# Patient Record
Sex: FEMALE | Race: WHITE | NOT HISPANIC OR LATINO | Employment: PART TIME | URBAN - METROPOLITAN AREA
[De-identification: names, ages, dates, MRNs, and addresses within clinical notes are randomized per-mention and may not be internally consistent; named-entity substitution may affect disease eponyms.]

---

## 2017-12-14 ENCOUNTER — HOSPITAL ENCOUNTER (EMERGENCY)
Facility: HOSPITAL | Age: 47
Discharge: HOME | End: 2017-12-14
Admitting: EMERGENCY MEDICINE
Payer: COMMERCIAL

## 2017-12-14 ENCOUNTER — APPOINTMENT (EMERGENCY)
Dept: RADIOLOGY | Facility: HOSPITAL | Age: 47
End: 2017-12-14
Payer: COMMERCIAL

## 2017-12-14 VITALS
OXYGEN SATURATION: 93 % | HEART RATE: 64 BPM | TEMPERATURE: 98.5 F | DIASTOLIC BLOOD PRESSURE: 69 MMHG | WEIGHT: 200 LBS | RESPIRATION RATE: 16 BRPM | SYSTOLIC BLOOD PRESSURE: 120 MMHG

## 2017-12-14 DIAGNOSIS — S92.535A CLOSED NONDISPLACED FRACTURE OF DISTAL PHALANX OF LESSER TOE OF LEFT FOOT, INITIAL ENCOUNTER: Primary | ICD-10-CM

## 2017-12-14 PROCEDURE — 99283 EMERGENCY DEPT VISIT LOW MDM: CPT

## 2017-12-14 PROCEDURE — 73660 X-RAY EXAM OF TOE(S): CPT

## 2017-12-14 RX ORDER — TRAMADOL HYDROCHLORIDE 50 MG/1
50 TABLET ORAL EVERY 6 HOURS PRN
Qty: 10 TABLET | Refills: 0 | Status: SHIPPED | OUTPATIENT
Start: 2017-12-14 | End: 2018-08-17

## 2017-12-15 NOTE — ED NOTES
Patient transported to [facility], [address] [place] [address]  12/14/17 2022

## 2017-12-15 NOTE — ED PROVIDER NOTES
History  Chief Complaint   Patient presents with    Toe Injury     Jammed L fifth toe today  Pain, swelling, bruising to area  No medication taken PTA       47y  o female with PMH of asthma presents to the ER for left 5th toe pain since 12:30 today  Patient states she jammed her toe  She denies taking any medication for pain  She describes her pain as sharp and radiating into her foot  She states her pain is constant  She denies fever, chills, chest pain, dyspnea, N/V/D, abdominal pain, weakness or paresthesias  History provided by:  Patient   used: No        None       Past Medical History:   Diagnosis Date    Asthma     Gastritis        History reviewed  No pertinent surgical history  History reviewed  No pertinent family history  I have reviewed and agree with the history as documented  Social History   Substance Use Topics    Smoking status: Current Every Day Smoker     Packs/day: 1 00    Smokeless tobacco: Never Used    Alcohol use No        Review of Systems   Constitutional: Negative for chills and fever  Respiratory: Negative for shortness of breath  Cardiovascular: Negative for chest pain  Gastrointestinal: Negative for abdominal pain, diarrhea, nausea and vomiting  Musculoskeletal: Negative for joint swelling and neck stiffness  Skin: Negative for rash  Allergic/Immunologic: Negative for food allergies  Neurological: Negative for weakness and numbness  Physical Exam  ED Triage Vitals [12/14/17 1827]   Temperature Pulse Respirations Blood Pressure SpO2   98 5 °F (36 9 °C) 64 16 120/69 93 %      Temp Source Heart Rate Source Patient Position - Orthostatic VS BP Location FiO2 (%)   Temporal Monitor Sitting Left arm --      Pain Score       3           Orthostatic Vital Signs  Vitals:    12/14/17 1827   BP: 120/69   Pulse: 64   Patient Position - Orthostatic VS: Sitting       Physical Exam   Constitutional:  Non-toxic appearance  No distress  HENT:   Head: Normocephalic and atraumatic  Right Ear: Tympanic membrane, external ear and ear canal normal  No drainage, swelling or tenderness  No foreign bodies  Tympanic membrane is not erythematous  No hemotympanum  Left Ear: Tympanic membrane, external ear and ear canal normal  No drainage, swelling or tenderness  No foreign bodies  Tympanic membrane is not erythematous  No hemotympanum  Nose: Nose normal    Mouth/Throat: Uvula is midline, oropharynx is clear and moist and mucous membranes are normal  No uvula swelling  No posterior oropharyngeal edema, posterior oropharyngeal erythema or tonsillar abscesses  No tonsillar exudate  Neck: Normal range of motion and phonation normal  Neck supple  No tracheal deviation present  Cardiovascular: Normal rate, regular rhythm, S1 normal, S2 normal and normal heart sounds  Exam reveals no gallop and no friction rub  No murmur heard  Pulmonary/Chest: Effort normal and breath sounds normal  No respiratory distress  She has no decreased breath sounds  She has no wheezes  She has no rhonchi  She has no rales  She exhibits no tenderness  Musculoskeletal:        Left foot: There is tenderness and bony tenderness  There is normal range of motion, no swelling, normal capillary refill, no crepitus, no deformity and no laceration  Feet:    Neurological: She is alert  GCS eye subscore is 4  GCS verbal subscore is 5  GCS motor subscore is 6  Skin: Skin is warm and dry  No rash noted  Psychiatric: She has a normal mood and affect  Nursing note and vitals reviewed        ED Medications  Medications - No data to display    Diagnostic Studies  Results Reviewed     None                 XR toe fifth min 2 views LEFT   ED Interpretation by Janett Adair PA-C (12/14 2031)   Fracture of base of distal phalanx                 Procedures  Orthopedic Injury  Date/Time: 12/14/2017 8:38 PM  Performed by: Lisandra Alexander by: Twan Mora   Consent: Verbal consent obtained  Consent given by: patient  Patient understanding: patient states understanding of the procedure being performed  Imaging studies: imaging studies available  Patient identity confirmed: arm band  Injury location: toe  Location details: left fifth toe  Injury type: fracture  Fracture type: distal phalanx  Pre-procedure neurovascular assessment: neurovascularly intact  Pre-procedure distal perfusion: normal  Pre-procedure neurological function: normal  Pre-procedure range of motion: normal    Anesthesia:  Local anesthesia used: no  Manipulation performed: no  Immobilization: other (comment) (ortho shoe)  Post-procedure neurovascular assessment: post-procedure neurovascularly intact  Post-procedure distal perfusion: normal  Post-procedure neurological function: normal  Post-procedure range of motion: normal  Patient tolerance: Patient tolerated the procedure well with no immediate complications             Phone Contacts  ED Phone Contact    ED Course  ED Course                                MDM  Number of Diagnoses or Management Options  Closed nondisplaced fracture of distal phalanx of lesser toe of left foot, initial encounter: new and requires workup  Diagnosis management comments: DDX consists of but not limited to: fracture, contusion, dislocation, sprain    Will xray the toe to r/o fracture  At discharge, I instructed the patient to:  -follow up with pcp  -take Tylenol or Motrin for mild pain  -take Tramadol as prescribed for moderate pain  -follow up with the recommended Podiatrist  -rest, ice and elevate the foot  -wear the ortho shoe  -return to the ER if symptoms worsened or new symptoms arose  Patient agreed to this plan and was stable at time of discharge         Amount and/or Complexity of Data Reviewed  Tests in the radiology section of CPT®: ordered and reviewed  Independent visualization of images, tracings, or specimens: yes    Patient Progress  Patient progress: stable    CritCare Time    Disposition  Final diagnoses:   Closed nondisplaced fracture of distal phalanx of lesser toe of left foot, initial encounter     Time reflects when diagnosis was documented in both MDM as applicable and the Disposition within this note     Time User Action Codes Description Comment    12/14/2017  8:32 PM Namita BLAIR Add [[id number] [id number]] Closed nondisplaced fracture of distal phalanx of lesser toe of left foot, initial encounter       ED Disposition     ED Disposition Condition Comment    Discharge  [facility] discharge to home/self care  Condition at discharge: Stable        Follow-up Information     Follow up With Specialties Details Why [address]  Schedule an appointment as soon as possible for a visit in 1 day  [address]      Raisa Chan DPM Podiatry Schedule an appointment as soon as possible for a visit in 1 day  Anastasia Sandoval [address] [place] [address]          Discharge Medication List as of 12/14/2017  8:43 PM      START taking these medications    Details   traMADol (ULTRAM) 50 mg tablet Take 1 tablet by mouth every 6 (six) hours as needed for moderate pain, Starting Thu 12/14/2017, Print           No discharge procedures on file      ED Provider  Electronically Signed by           Stephen Amaya PA-C  12/14/17 1007

## 2017-12-15 NOTE — DISCHARGE INSTRUCTIONS
Toe Fracture   WHAT YOU NEED TO KNOW:   A toe fracture is a break in 1 or more of the bones in your toe  It is most commonly caused by a direct blow to the toe  The bones in your toe may just be broken, or they may be out of place or   DISCHARGE INSTRUCTIONS:   Return to the emergency department if:   · Blood soaks through your bandage  · You have severe pain in your toe  · Your toe is cold or numb  Contact your healthcare provider if:   · You have a fever  · Your pain does not go away, even after treatment  · Your toe continues to hurt even after it has healed  · You have questions or concerns about your condition or care  Medicines: You may need any of the following:  · NSAIDs , such as ibuprofen, help decrease swelling, pain, and fever  This medicine is available with or without a doctor's order  NSAIDs can cause stomach bleeding or kidney problems in certain people  If you take blood thinner medicine, always ask your healthcare provider if NSAIDs are safe for you  Always read the medicine label and follow directions  · Prescription pain medicine  may be given  Ask your healthcare provider how to take this medicine safely  Some prescription pain medicines contain acetaminophen  Do not take other medicines that contain acetaminophen without talking to your healthcare provider  Too much acetaminophen may cause liver damage  Prescription pain medicine may cause constipation  Ask your healthcare provider how to prevent or treat constipation  · Antibiotics  treat a bacterial infection  You may need antibiotics if you have an open wound  · Take your medicine as directed  Contact your healthcare provider if you think your medicine is not helping or if you have side effects  Tell him of her if you are allergic to any medicine  Keep a list of the medicines, vitamins, and herbs you take  Include the amounts, and when and why you take them   Bring the list or the pill bottles to follow-up visits  Carry your medicine list with you in case of an emergency  Self-care:   · Use abel tape, an elastic bandage, or a splint as directed  These help keep your toe in its correct position as it heals  Abel tape is when your fractured toe and the toe next to it are taped together  · Use support devices  including a cane, crutches, walking boot, or hard soled shoe as directed  These help protect your broken toe and limit movement so it can heal     · Rest  your toe so that it can heal  Return to normal activities as directed  · Apply ice  on your toe for 15 to 20 minutes every hour or as directed  Use an ice pack, or put crushed ice in a plastic bag  Cover it with a towel  Ice helps prevent tissue damage and decreases swelling and pain  · Elevate  your toe above the level of your heart as often as you can  This will help decrease swelling and pain  Prop your toe on pillows or blankets to keep it elevated comfortably  Follow up with your healthcare provider as directed: You may need to see a podiatrist in 1 to 2 weeks  Write down your questions so you remember to ask them during your visits  © 2017 [facility] Information is for End User's use only and may not be sold, redistributed or otherwise used for commercial purposes  All illustrations and images included in CareNotes® are the copyrighted property of [employer]  or Ricky Soria  The above information is an  only  It is not intended as medical advice for individual conditions or treatments  Talk to your doctor, nurse or pharmacist before following any medical regimen to see if it is safe and effective for you  DISCHARGE INSTRUCTIONS:    FOLLOW UP WITH YOUR PRIMARY CARE PROVIDER OR THE [address] [place] [address]  MAKE AN APPOINTMENT TO BE SEEN  TAKE TYLENOL OR MOTRIN FOR PAIN  TAKE TRAMADOL AS PRESCRIBED FOR MODERATE PAIN   IF RASH, SHORTNESS OF BREATH OR TROUBLE SWALLOWING, STOP TAKING THE MEDICATION AND BE SEEN  NO DRINKING, DRIVING OR OPERATING HEAVY MACHINERY WHILE TAKING THIS MEDICATION  FOLLOW UP WITH THE RECOMMENDED PODIATRY SPECIALIST  MAKE AN APPOINTMENT TO BE SEEN  REST, ICE AND ELEVATE THE AREA  WEAR THE ORTHOPEDIC SHOE  IF SYMPTOMS WORSEN OR NEW SYMPTOMS ARISE, RETURN TO THE ER TO BE SEEN

## 2018-03-26 ENCOUNTER — OFFICE VISIT (OUTPATIENT)
Dept: URGENT CARE | Facility: MEDICAL CENTER | Age: 48
End: 2018-03-26
Payer: COMMERCIAL

## 2018-03-26 VITALS
HEIGHT: 65 IN | DIASTOLIC BLOOD PRESSURE: 80 MMHG | OXYGEN SATURATION: 97 % | SYSTOLIC BLOOD PRESSURE: 120 MMHG | BODY MASS INDEX: 38.45 KG/M2 | TEMPERATURE: 97.6 F | HEART RATE: 69 BPM | RESPIRATION RATE: 18 BRPM | WEIGHT: 230.8 LBS

## 2018-03-26 DIAGNOSIS — K29.50 CHRONIC GASTRITIS WITHOUT BLEEDING, UNSPECIFIED GASTRITIS TYPE: Primary | ICD-10-CM

## 2018-03-26 PROCEDURE — 99213 OFFICE O/P EST LOW 20 MIN: CPT | Performed by: FAMILY MEDICINE

## 2018-03-26 RX ORDER — OMEPRAZOLE 10 MG/1
10 CAPSULE, DELAYED RELEASE ORAL
COMMUNITY
End: 2018-03-26 | Stop reason: DRUGHIGH

## 2018-03-26 RX ORDER — OMEPRAZOLE 40 MG/1
40 CAPSULE, DELAYED RELEASE ORAL DAILY
Qty: 30 CAPSULE | Refills: 0 | Status: SHIPPED | OUTPATIENT
Start: 2018-03-26 | End: 2018-04-13 | Stop reason: SDUPTHER

## 2018-03-26 RX ORDER — ALBUTEROL SULFATE 90 UG/1
1 AEROSOL, METERED RESPIRATORY (INHALATION) EVERY 6 HOURS
COMMUNITY
End: 2018-04-13 | Stop reason: SDUPTHER

## 2018-03-27 NOTE — PROGRESS NOTES
[facility]        NAME: Allen Pulido is a 52 y o  female  : 1970    MRN: [id number]  DATE: 2018  TIME: 10:29 PM    Assessment and Plan   Chronic gastritis without bleeding, unspecified gastritis type [K29 50]  1  Chronic gastritis without bleeding, unspecified gastritis type  omeprazole (PriLOSEC) 40 MG capsule         Patient Instructions       Follow up with PCP in 3-5 days  Proceed to  ER if symptoms worsen  Chief Complaint     Chief Complaint   Patient presents with    Abdominal Pain     for several days  History of Present Illness       71-year-old female here today with 4 day history of abdominal pain  Abdominal pain is predominant midepigastric and travels to the size  It is at times accompanied by nausea but no vomiting  She has a known history of gastritis  However, 4 days ago she ran out of her prescription strength omeprazole 40 mg  She has been taking over-the-counter omeprazole 2 capsules daily with no significant improvement  Denies any fever chills  Abdominal Pain   Associated symptoms include nausea  Review of Systems   Review of Systems   Constitutional: Negative  Gastrointestinal: Positive for abdominal pain and nausea           Current Medications       Current Outpatient Prescriptions:     albuterol (PROVENTIL HFA,VENTOLIN HFA) 90 mcg/act inhaler, Inhale 1 puff every 6 (six) hours, Disp: , Rfl:     omeprazole (PriLOSEC) 40 MG capsule, Take 1 capsule (40 mg total) by mouth daily, Disp: 30 capsule, Rfl: 0    traMADol (ULTRAM) 50 mg tablet, Take 1 tablet by mouth every 6 (six) hours as needed for moderate pain, Disp: 10 tablet, Rfl: 0    Current Allergies     Allergies as of 2018 - Reviewed 2018   Allergen Reaction Noted    Other  2017    Penicillins  2017            The following portions of the patient's history were reviewed and updated as appropriate: allergies, current medications, past family history, past medical history, past social history, past surgical history and problem list      Past Medical History:   Diagnosis Date    Asthma     Gastritis        No past surgical history on file  No family history on file  Medications have been verified  Objective   /80   Pulse 69   Temp 97 6 °F (36 4 °C) (Tympanic)   Resp 18   Ht 5' 5" (1 651 m)   Wt 105 kg (230 lb 12 8 oz)   SpO2 97%   BMI 38 41 kg/m²        Physical Exam     Physical Exam   Abdominal: Bowel sounds are normal  There is tenderness  There is no rebound  Nursing note and vitals reviewed

## 2018-03-27 NOTE — PATIENT INSTRUCTIONS
I prescribed omeprazole 40 mg capsule once daily  Advised patient to follow dietary changes which she is aware of  Strongly advised her to schedule appointment with her primary care provider  For gas, I recommended over-the-counter simethicone she can use as needed  Duodenitis   WHAT YOU NEED TO KNOW:   Duodenitis is inflammation or irritation of the duodenum  The duodenum is the first part of the small intestine, just below your stomach  DISCHARGE INSTRUCTIONS:   Return to the emergency department if:   · You have severe abdominal pain  · You have bloody or black, tarry bowel movements or vomit  Contact your healthcare provider if:   · You have new or worsening symptoms, even after treatment  · You have questions or concerns about your condition or care  Medicines:   · Medicines  may be given to treat an infection or to control stomach acid  · Take your medicine as directed  Contact your healthcare provider if you think your medicine is not helping or if you have side effects  Tell him of her if you are allergic to any medicine  Keep a list of the medicines, vitamins, and herbs you take  Include the amounts, and when and why you take them  Bring the list or the pill bottles to follow-up visits  Carry your medicine list with you in case of an emergency  Follow up with your healthcare provider as directed: You may need ongoing tests or treatment  Write down your questions so you remember to ask them during your visits  Do not smoke:  Nicotine and other chemicals in cigarettes and cigars can cause blood vessel and lung damage  Ask your healthcare provider for information if you currently smoke and need help to quit  E-cigarettes or smokeless tobacco still contain nicotine  Talk to your healthcare provider before you use these products  Limit or do not drink alcohol:  Alcohol can make your duodenitis worse  Talk to your healthcare provider if you need help to stop drinking    Keep batteries and similar objects out of the reach of children:  Babies often put items in their mouths to explore them  Button batteries are easy to swallow and can cause serious damage  Keep the battery covers of electronic devices such as remote controls taped closed  Store all batteries and toxic materials where children cannot get to them  Use childproof locks to keep children away from dangerous materials  Manage or prevent duodenitis:   · Do not take NSAIDs or aspirin unless directed  These and similar medicines can cause irritation  It may help to take NSAIDs with food, but you may not be able to take them at all  · Do not eat foods that cause irritation  Foods such as oranges and salsa can cause burning or pain  Eat a variety of healthy foods  Examples include fruits (not citrus), vegetables, low-fat dairy products, beans, whole-grain breads, and lean meats and fish  Try to eat small meals, and drink water with your meals  Do not eat for at least 3 hours before you go to bed  © 2017 [employer]  Information is for End User's use only and may not be sold, redistributed or otherwise used for commercial purposes  All illustrations and images included in CareNotes® are the copyrighted property of [employer]  or Ricky Soria  The above information is an  only  It is not intended as medical advice for individual conditions or treatments  Talk to your doctor, nurse or pharmacist before following any medical regimen to see if it is safe and effective for you

## 2018-04-13 ENCOUNTER — OFFICE VISIT (OUTPATIENT)
Dept: FAMILY MEDICINE CLINIC | Facility: CLINIC | Age: 48
End: 2018-04-13
Payer: COMMERCIAL

## 2018-04-13 VITALS
HEIGHT: 65 IN | DIASTOLIC BLOOD PRESSURE: 74 MMHG | WEIGHT: 224 LBS | SYSTOLIC BLOOD PRESSURE: 122 MMHG | TEMPERATURE: 98.1 F | OXYGEN SATURATION: 97 % | RESPIRATION RATE: 18 BRPM | BODY MASS INDEX: 37.32 KG/M2 | HEART RATE: 81 BPM

## 2018-04-13 DIAGNOSIS — Z13.1 SCREENING FOR DIABETES MELLITUS: ICD-10-CM

## 2018-04-13 DIAGNOSIS — Z13.220 SCREENING CHOLESTEROL LEVEL: ICD-10-CM

## 2018-04-13 DIAGNOSIS — K29.50 CHRONIC GASTRITIS WITHOUT BLEEDING, UNSPECIFIED GASTRITIS TYPE: Primary | ICD-10-CM

## 2018-04-13 DIAGNOSIS — Z12.31 ENCOUNTER FOR SCREENING MAMMOGRAM FOR BREAST CANCER: ICD-10-CM

## 2018-04-13 DIAGNOSIS — Z12.4 SCREENING FOR CERVICAL CANCER: ICD-10-CM

## 2018-04-13 DIAGNOSIS — M77.12 LEFT LATERAL EPICONDYLITIS: ICD-10-CM

## 2018-04-13 DIAGNOSIS — E55.9 VITAMIN D DEFICIENCY: ICD-10-CM

## 2018-04-13 DIAGNOSIS — J45.20 MILD INTERMITTENT ASTHMA WITHOUT COMPLICATION: ICD-10-CM

## 2018-04-13 PROBLEM — J30.9 ALLERGIC RHINITIS: Status: ACTIVE | Noted: 2018-04-13

## 2018-04-13 PROCEDURE — 99204 OFFICE O/P NEW MOD 45 MIN: CPT | Performed by: PHYSICIAN ASSISTANT

## 2018-04-13 RX ORDER — OMEPRAZOLE 40 MG/1
40 CAPSULE, DELAYED RELEASE ORAL DAILY
Qty: 90 CAPSULE | Refills: 1 | Status: SHIPPED | OUTPATIENT
Start: 2018-04-13 | End: 2018-10-18 | Stop reason: SDUPTHER

## 2018-04-13 RX ORDER — ALBUTEROL SULFATE 90 UG/1
1 AEROSOL, METERED RESPIRATORY (INHALATION) EVERY 6 HOURS
Qty: 1 EACH | Refills: 2 | Status: SHIPPED | OUTPATIENT
Start: 2018-04-13 | End: 2018-07-23 | Stop reason: SDUPTHER

## 2018-04-13 NOTE — ASSESSMENT & PLAN NOTE
Stable  Continue using rescue inhaler as needed  If using inhaler more than twice a week would recommend making a follow-up appointment to see if maintenance medication needs to be used to control symptoms

## 2018-04-13 NOTE — PROGRESS NOTES
Assessment/Plan:    Chronic gastritis without bleeding  Stable  Continue with omeprazole 40 mg daily  Mild intermittent asthma without complication  Stable  Continue using rescue inhaler as needed  If using inhaler more than twice a week would recommend making a follow-up appointment to see if maintenance medication needs to be used to control symptoms  Allergic rhinitis  Stable  Continue to use Claritin as needed  For lateral epicondylitis, would recommend using NSAIDs to decrease the swelling, and also recommend RICE  If there is no improvement in symptoms make follow-up appointment we can refer to physical therapy for further evaluation and treatment  Diagnoses and all orders for this visit:    Chronic gastritis without bleeding, unspecified gastritis type  -     omeprazole (PriLOSEC) 40 MG capsule; Take 1 capsule (40 mg total) by mouth daily    Mild intermittent asthma without complication  -     albuterol (PROVENTIL HFA,VENTOLIN HFA) 90 mcg/act inhaler; Inhale 1 puff every 6 (six) hours    Left lateral epicondylitis    Screening cholesterol level  -     Lipid panel; Future    Screening for diabetes mellitus  -     CBC and differential; Future  -     Comprehensive metabolic panel; Future  -     Urinalysis with reflex to microscopic; Future    Encounter for screening mammogram for breast cancer  -     Mammo screening bilateral w cad; Future    Vitamin D deficiency  -     Vitamin D 25 hydroxy; Future    Screening for cervical cancer  -     Ambulatory referral to Obstetrics / Gynecology; Future          Subjective:      Patient ID: Bobby Burns is a 52 y o  female  72-year-old female presenting to establish care  Patient has recently moved here from [place]  Patient states that she has been diagnosed with herniated disc in her cervical and lumbar spine  Currently has no concerns or pain in these areas  Has been diagnosed with chronic gastritis    Was placed on omeprazole 2 years ago, and states she has not had any problems since  At that time she also had a colonoscopy and EGD completed  Denies any concerns at this time  Has been diagnosed with vitamin-D deficiency in the past and was on vitamin D supplements  Currently has allergies, and uses Claritin as needed  Has also been diagnosed with asthma and uses a rescue inhaler on an as-needed basis  Patient does have concerns of left elbow pain and swelling  States that the pain has been going on for about 2 weeks  Describes it as a sharp pain on the lateral aspect of the elbow  Has also noticed swelling in this area  Pain is typically worse in the morning after waking up, and also when excessively using her left elbow or trying to grab something with her left hand  Has not been taking anything over-the-counter for this  Denies any injury to the left elbow  Patient has never had a mammogram completed  Currently does not have an OBGYN in this area  The following portions of the patient's history were reviewed and updated as appropriate:   She  has a past medical history of Asthma and Gastritis  She   Patient Active Problem List    Diagnosis Date Noted    Chronic gastritis without bleeding 04/13/2018    Mild intermittent asthma without complication 32/57/7329    Allergic rhinitis 04/13/2018     She  has no past surgical history on file  Her family history includes Cancer in her mother; No Known Problems in her father  She  reports that she has been smoking  She has been smoking about 1 00 pack per day  She has never used smokeless tobacco  She reports that she does not drink alcohol or use drugs    Current Outpatient Prescriptions   Medication Sig Dispense Refill    albuterol (PROVENTIL HFA,VENTOLIN HFA) 90 mcg/act inhaler Inhale 1 puff every 6 (six) hours 1 each 2    omeprazole (PriLOSEC) 40 MG capsule Take 1 capsule (40 mg total) by mouth daily 90 capsule 1    traMADol (ULTRAM) 50 mg tablet Take 1 tablet by mouth every 6 (six) hours as needed for moderate pain 10 tablet 0     No current facility-administered medications for this visit  She is allergic to other and penicillins       Review of Systems   Constitutional: Negative for chills, fatigue and fever  HENT: Negative for congestion, ear pain, hearing loss, rhinorrhea and sore throat  Eyes: Negative for pain and visual disturbance  Respiratory: Negative for cough, shortness of breath and wheezing  Cardiovascular: Negative for chest pain, palpitations and leg swelling  Gastrointestinal: Negative for abdominal pain, blood in stool, constipation, diarrhea, nausea and vomiting  Endocrine: Negative for cold intolerance, heat intolerance and polyuria  Genitourinary: Negative for difficulty urinating, dysuria, frequency, hematuria and urgency  Musculoskeletal: Negative for arthralgias, joint swelling and myalgias  Skin: Negative for rash and wound  Neurological: Negative for dizziness, syncope, weakness, numbness and headaches  Psychiatric/Behavioral: Negative for dysphoric mood and sleep disturbance  The patient is not nervous/anxious  Objective:      /74 (BP Location: Right arm, Patient Position: Sitting, Cuff Size: Large)   Pulse 81   Temp 98 1 °F (36 7 °C) (Tympanic)   Resp 18   Ht 5' 5" (1 651 m)   Wt 102 kg (224 lb)   SpO2 97%   BMI 37 28 kg/m²          Physical Exam   Constitutional: She is oriented to person, place, and time  She appears well-developed and well-nourished  HENT:   Right Ear: Hearing, tympanic membrane, external ear and ear canal normal    Left Ear: Hearing, tympanic membrane, external ear and ear canal normal    Nose: Nose normal    Mouth/Throat: Oropharynx is clear and moist and mucous membranes are normal  No oropharyngeal exudate  Eyes: Conjunctivae, EOM and lids are normal  Pupils are equal, round, and reactive to light  Neck: Normal range of motion  Neck supple     Cardiovascular: Normal rate, regular rhythm, normal heart sounds and normal pulses  Exam reveals no gallop and no friction rub  No murmur heard  Pulmonary/Chest: Effort normal and breath sounds normal  No respiratory distress  She has no wheezes  She has no rales  Abdominal: Soft  Normal appearance and bowel sounds are normal  She exhibits no distension  There is no tenderness  There is no rebound and no guarding  Musculoskeletal: Normal range of motion  She exhibits no edema  Left forearm: She exhibits tenderness and swelling  She exhibits no edema, no deformity and no laceration  Lymphadenopathy:     She has no cervical adenopathy  Neurological: She is alert and oriented to person, place, and time  She has normal reflexes  No cranial nerve deficit  Skin: Skin is warm and dry  No rash noted  Psychiatric: She has a normal mood and affect   Her behavior is normal

## 2018-06-10 ENCOUNTER — OFFICE VISIT (OUTPATIENT)
Dept: URGENT CARE | Facility: MEDICAL CENTER | Age: 48
End: 2018-06-10
Payer: COMMERCIAL

## 2018-06-10 VITALS
TEMPERATURE: 96.8 F | HEART RATE: 72 BPM | OXYGEN SATURATION: 100 % | RESPIRATION RATE: 20 BRPM | SYSTOLIC BLOOD PRESSURE: 132 MMHG | DIASTOLIC BLOOD PRESSURE: 74 MMHG

## 2018-06-10 DIAGNOSIS — R31.9 URINARY TRACT INFECTION WITH HEMATURIA, SITE UNSPECIFIED: Primary | ICD-10-CM

## 2018-06-10 DIAGNOSIS — R30.0 DYSURIA: ICD-10-CM

## 2018-06-10 DIAGNOSIS — N39.0 URINARY TRACT INFECTION WITH HEMATURIA, SITE UNSPECIFIED: Primary | ICD-10-CM

## 2018-06-10 LAB
SL AMB  POCT GLUCOSE, UA: NEGATIVE
SL AMB LEUKOCYTE ESTERASE,UA: ABNORMAL
SL AMB POCT BILIRUBIN,UA: NEGATIVE
SL AMB POCT BLOOD,UA: ABNORMAL
SL AMB POCT CLARITY,UA: ABNORMAL
SL AMB POCT COLOR,UA: YELLOW
SL AMB POCT KETONES,UA: NEGATIVE
SL AMB POCT NITRITE,UA: POSITIVE
SL AMB POCT PH,UA: 6
SL AMB POCT SPECIFIC GRAVITY,UA: 1.02
SL AMB POCT URINE PROTEIN: ABNORMAL
SL AMB POCT UROBILINOGEN: NEGATIVE

## 2018-06-10 PROCEDURE — 87186 SC STD MICRODIL/AGAR DIL: CPT | Performed by: FAMILY MEDICINE

## 2018-06-10 PROCEDURE — 99213 OFFICE O/P EST LOW 20 MIN: CPT | Performed by: FAMILY MEDICINE

## 2018-06-10 PROCEDURE — 87086 URINE CULTURE/COLONY COUNT: CPT | Performed by: FAMILY MEDICINE

## 2018-06-10 PROCEDURE — 87077 CULTURE AEROBIC IDENTIFY: CPT | Performed by: FAMILY MEDICINE

## 2018-06-10 PROCEDURE — 81002 URINALYSIS NONAUTO W/O SCOPE: CPT | Performed by: FAMILY MEDICINE

## 2018-06-10 RX ORDER — CIPROFLOXACIN 500 MG/1
500 TABLET, FILM COATED ORAL EVERY 12 HOURS SCHEDULED
Qty: 10 TABLET | Refills: 0 | Status: SHIPPED | COMMUNITY
Start: 2018-06-10 | End: 2018-06-15

## 2018-06-10 NOTE — PROGRESS NOTES
[facility]        NAME: Mandy Wu is a 52 y o  female  : 1970    MRN: [id number]  DATE: Shirley 10, 2018  TIME: 4:40 PM    Assessment and Plan   Urinary tract infection with hematuria, site unspecified [N39 0, R31 9]  1  Urinary tract infection with hematuria, site unspecified  ciprofloxacin (CIPRO) 500 mg tablet   2  Dysuria  POCT urine dip    Urine culture         Patient Instructions       Follow up with PCP in 3-5 days  Proceed to  ER if symptoms worsen  Chief Complaint     Chief Complaint   Patient presents with    Possible UTI     dysuria and frequency for 1 day  Right low back pain for 1 week   Denies fever or chills  History of Present Illness       Patient complaining of right lower back pain for the past week  Seems to be dull in nature but has increased in intensity  She has also developed some frequency and urgency in the last 2 days  Also in last 2 days she has developed some dysuria  Denies any fever or chills  Taking no medication for current symptoms  Review of Systems   Review of Systems   Constitutional: Negative  Genitourinary: Positive for dysuria, flank pain, frequency and urgency           Current Medications       Current Outpatient Prescriptions:     albuterol (PROVENTIL HFA,VENTOLIN HFA) 90 mcg/act inhaler, Inhale 1 puff every 6 (six) hours, Disp: 1 each, Rfl: 2    omeprazole (PriLOSEC) 40 MG capsule, Take 1 capsule (40 mg total) by mouth daily, Disp: 90 capsule, Rfl: 1    traMADol (ULTRAM) 50 mg tablet, Take 1 tablet by mouth every 6 (six) hours as needed for moderate pain, Disp: 10 tablet, Rfl: 0    ciprofloxacin (CIPRO) 500 mg tablet, Take 1 tablet (500 mg total) by mouth every 12 (twelve) hours for 5 days, Disp: 10 tablet, Rfl: 0    Current Allergies     Allergies as of 06/10/2018 - Reviewed 06/10/2018   Allergen Reaction Noted    Other  2017    Penicillins  2017            The following portions of the patient's history were reviewed and updated as appropriate: allergies, current medications, past family history, past medical history, past social history, past surgical history and problem list      Past Medical History:   Diagnosis Date    Asthma     Gastritis        No past surgical history on file  Family History   Problem Relation Age of Onset    Cancer Mother     No Known Problems Father          Medications have been verified  Objective   /74 (BP Location: Left arm, Patient Position: Sitting, Cuff Size: Standard)   Pulse 72   Temp (!) 96 8 °F (36 °C) (Tympanic)   Resp 20   SpO2 100%        Physical Exam     Physical Exam   Abdominal: Soft  There is tenderness  Tenderness over suprapubic area  No rebound or guarding appreciated  No CVA tenderness elicited  Nursing note and vitals reviewed

## 2018-06-10 NOTE — PATIENT INSTRUCTIONS
Urine dipstick reveals moderate leukocytes, nitrite positive and blood  Patient started empirically on Cipro 500 mg twice a day for 5 days  Encourage patient increase fluid intake  Urinary Tract Infection in Women   WHAT YOU NEED TO KNOW:   A urinary tract infection (UTI) is caused by bacteria that get inside your urinary tract  Most bacteria that enter your urinary tract come out when you urinate  If the bacteria stay in your urinary tract, you may get an infection  Your urinary tract includes your kidneys, ureters, bladder, and urethra  Urine is made in your kidneys, and it flows from the ureters to the bladder  Urine leaves the bladder through the urethra  A UTI is more common in your lower urinary tract, which includes your bladder and urethra  DISCHARGE INSTRUCTIONS:   Return to the emergency department if:   · You are urinating very little or not at all  · You have a high fever with shaking chills  · You have side or back pain that gets worse  Contact your healthcare provider if:   · You have a fever  · You do not feel better after 2 days of taking antibiotics  · You are vomiting  · You have questions or concerns about your condition or care  Medicines:   · Antibiotics  help fight a bacterial infection  · Medicines  may be given to decrease pain and burning when you urinate  They will also help decrease the feeling that you need to urinate often  These medicines will make your urine orange or red  · Take your medicine as directed  Contact your healthcare provider if you think your medicine is not helping or if you have side effects  Tell him or her if you are allergic to any medicine  Keep a list of the medicines, vitamins, and herbs you take  Include the amounts, and when and why you take them  Bring the list or the pill bottles to follow-up visits  Carry your medicine list with you in case of an emergency    Follow up with your healthcare provider as directed:  Write down your questions so you remember to ask them during your visits  Prevent another UTI:   · Empty your bladder often  Urinate and empty your bladder as soon as you feel the need  Do not hold your urine for long periods of time  · Wipe from front to back after you urinate or have a bowel movement  This will help prevent germs from getting into your urinary tract through your urethra  · Drink liquids as directed  Ask how much liquid to drink each day and which liquids are best for you  You may need to drink more liquids than usual to help flush out the bacteria  Do not drink alcohol, caffeine, or citrus juices  These can irritate your bladder and increase your symptoms  Your healthcare provider may recommend cranberry juice to help prevent a UTI  · Urinate after you have sex  This can help flush out bacteria passed during sex  · Do not douche or use feminine deodorants  These can change the chemical balance in your vagina  · Change sanitary pads or tampons often  This will help prevent germs from getting into your urinary tract  · Do pelvic muscle exercises often  Pelvic muscle exercises may help you start and stop urinating  Strong pelvic muscles may help you empty your bladder easier  Squeeze these muscles tightly for 5 seconds like you are trying to hold back urine  Then relax for 5 seconds  Gradually work up to squeezing for 10 seconds  Do 3 sets of 15 repetitions a day, or as directed  © 2017 [employer] Information is for End User's use only and may not be sold, redistributed or otherwise used for commercial purposes  All illustrations and images included in CareNotes® are the copyrighted property of [employer]  or [employer]  The above information is an  only  It is not intended as medical advice for individual conditions or treatments   Talk to your doctor, nurse or pharmacist before following any medical regimen to see if it is safe and effective for you

## 2018-06-12 LAB — BACTERIA UR CULT: ABNORMAL

## 2018-07-23 DIAGNOSIS — J45.20 MILD INTERMITTENT ASTHMA WITHOUT COMPLICATION: ICD-10-CM

## 2018-08-17 ENCOUNTER — OFFICE VISIT (OUTPATIENT)
Dept: URGENT CARE | Facility: MEDICAL CENTER | Age: 48
End: 2018-08-17
Payer: COMMERCIAL

## 2018-08-17 VITALS
SYSTOLIC BLOOD PRESSURE: 112 MMHG | HEART RATE: 76 BPM | DIASTOLIC BLOOD PRESSURE: 80 MMHG | RESPIRATION RATE: 18 BRPM | TEMPERATURE: 98.8 F | WEIGHT: 219 LBS | HEIGHT: 65 IN | BODY MASS INDEX: 36.49 KG/M2 | OXYGEN SATURATION: 95 %

## 2018-08-17 DIAGNOSIS — J02.9 SORE THROAT: Primary | ICD-10-CM

## 2018-08-17 LAB — S PYO AG THROAT QL: NEGATIVE

## 2018-08-17 PROCEDURE — 99213 OFFICE O/P EST LOW 20 MIN: CPT | Performed by: PHYSICIAN ASSISTANT

## 2018-08-17 PROCEDURE — 87070 CULTURE OTHR SPECIMN AEROBIC: CPT | Performed by: PHYSICIAN ASSISTANT

## 2018-08-17 PROCEDURE — 87147 CULTURE TYPE IMMUNOLOGIC: CPT | Performed by: PHYSICIAN ASSISTANT

## 2018-08-18 NOTE — PATIENT INSTRUCTIONS
Rapid strep was negative will order culture patient may call in 2 days for results  Use ibuprofen for pain or fever  Pharyngitis   WHAT YOU NEED TO KNOW:   Pharyngitis, or sore throat, is inflammation of the tissues and structures in your pharynx (throat)  Pharyngitis is most often caused by bacteria  It may also be caused by a cold or flu virus  Other causes include smoking, allergies, or acid reflux  DISCHARGE INSTRUCTIONS:   Call 911 for any of the following:   · You have trouble breathing or swallowing because your throat is swollen or sore  Return to the emergency department if:   · You are drooling because it hurts too much to swallow  · Your fever is higher than 102? F (39?C) or lasts longer than 3 days  · You are confused  · You taste blood in your throat  Contact your healthcare provider if:   · Your throat pain gets worse  · You have a painful lump in your throat that does not go away after 5 days  · Your symptoms do not improve after 5 days  · You have questions or concerns about your condition or care  Medicines:  Viral pharyngitis will go away on its own without treatment  Your sore throat should start to feel better in 3 to 5 days for both viral and bacterial infections  You may need any of the following:  · Antibiotics  treat a bacterial infection  · NSAIDs , such as ibuprofen, help decrease swelling, pain, and fever  NSAIDs can cause stomach bleeding or kidney problems in certain people  If you take blood thinner medicine, always ask your healthcare provider if NSAIDs are safe for you  Always read the medicine label and follow directions  · Acetaminophen  decreases pain and fever  It is available without a doctor's order  Ask how much to take and how often to take it  Follow directions  Acetaminophen can cause liver damage if not taken correctly  · Take your medicine as directed    Contact your healthcare provider if you think your medicine is not helping or if you have side effects  Tell him or her if you are allergic to any medicine  Keep a list of the medicines, vitamins, and herbs you take  Include the amounts, and when and why you take them  Bring the list or the pill bottles to follow-up visits  Carry your medicine list with you in case of an emergency  Manage your symptoms:   · Gargle salt water  Mix ¼ teaspoon salt in an 8 ounce glass of warm water and gargle  This may help decrease swelling in your throat  · Drink liquids as directed  You may need to drink more liquids than usual  Liquids may help soothe your throat and prevent dehydration  Ask how much liquid to drink each day and which liquids are best for you  · Use a cool-steam humidifier  to help moisten the air in your room and calm your cough  · Soothe your throat  with cough drops, ice, soft foods, or popsicles  Prevent the spread of pharyngitis:  Cover your mouth and nose when you cough or sneeze  Do not share food or drinks  Wash your hands often  Use soap and water  If soap and water are unavailable, use an alcohol based hand   Follow up with your healthcare provider as directed:  Write down your questions so you remember to ask them during your visits  © 2017 [employer] Information is for End User's use only and may not be sold, redistributed or otherwise used for commercial purposes  All illustrations and images included in CareNotes® are the copyrighted property of [employer]  or [employer]  The above information is an  only  It is not intended as medical advice for individual conditions or treatments  Talk to your doctor, nurse or pharmacist before following any medical regimen to see if it is safe and effective for you

## 2018-08-18 NOTE — PROGRESS NOTES
[facility]        NAME: Giancarlo Agarwal is a 52 y o  female  : 1970    MRN: [id number]  DATE: 2018  TIME: 8:37 PM    Assessment and Plan   Sore throat [J02 9]  1  Sore throat  POCT rapid strepA         Patient Instructions     Follow up with PCP in 3-5 days  Proceed to  ER if symptoms worsen  Chief Complaint     Chief Complaint   Patient presents with    Sore Throat     Symptoms x 3 days   Nasal Congestion         History of Present Illness       80-year-old female presents for complaints of sore throat and congestion  Patient states she has a history of allergies and believes her congestion and postnasal drip is from her allergies  She developed a sore throat 3 days ago and is complaining of pain with swallowing  She is also complaining of pus in her throat, body aches and pains as well  She is not taking any over-the-counter medication for relief  Review of Systems   Review of Systems   Constitutional: Negative  HENT: Positive for congestion and sore throat  Negative for ear discharge, ear pain, facial swelling, hearing loss, postnasal drip, rhinorrhea, sinus pain, sinus pressure, sneezing and trouble swallowing  Eyes: Negative  Respiratory: Negative  Gastrointestinal: Negative  Skin: Negative            Current Medications       Current Outpatient Prescriptions:     omeprazole (PriLOSEC) 40 MG capsule, Take 1 capsule (40 mg total) by mouth daily, Disp: 90 capsule, Rfl: 1    VENTOLIN  (90 Base) MCG/ACT inhaler, INHALE 1 PUFF BY MOUTH EVERY 6 HOURS, Disp: 18 g, Rfl: 2    Current Allergies     Allergies as of 2018 - Reviewed 06/10/2018   Allergen Reaction Noted    Other  2017    Penicillin g  2017    Penicillins  2017            The following portions of the patient's history were reviewed and updated as appropriate: allergies, current medications, past family history, past medical history, past social history, past surgical history and problem list     Objective   /80   Pulse 76   Temp 98 8 °F (37 1 °C)   Resp 18   Ht 5' 5" (1 651 m)   Wt 99 3 kg (219 lb)   SpO2 95%   BMI 36 44 kg/m²        Physical Exam     Physical Exam   Constitutional: Vital signs are normal  She appears well-developed and well-nourished  No distress  HENT:   Head: Normocephalic and atraumatic  Right Ear: Hearing, tympanic membrane, external ear and ear canal normal    Left Ear: Hearing, tympanic membrane, external ear and ear canal normal    Nose: Nose normal  No mucosal edema or rhinorrhea  Right sinus exhibits no maxillary sinus tenderness and no frontal sinus tenderness  Left sinus exhibits no maxillary sinus tenderness and no frontal sinus tenderness  Mouth/Throat: Uvula is midline and mucous membranes are normal  Oropharyngeal exudate and posterior oropharyngeal erythema present  No posterior oropharyngeal edema or tonsillar abscesses  Eyes: Conjunctivae and lids are normal    Cardiovascular: Normal rate, regular rhythm and normal heart sounds  No murmur heard  Pulmonary/Chest: Effort normal and breath sounds normal  No respiratory distress  She has no decreased breath sounds  She has no wheezes  She has no rhonchi  She has no rales  Lymphadenopathy:        Head (right side): No submandibular and no tonsillar adenopathy present  Head (left side): No submandibular and no tonsillar adenopathy present  Skin: No rash noted  Nursing note and vitals reviewed

## 2018-08-20 ENCOUNTER — OFFICE VISIT (OUTPATIENT)
Dept: FAMILY MEDICINE CLINIC | Facility: CLINIC | Age: 48
End: 2018-08-20
Payer: COMMERCIAL

## 2018-08-20 VITALS
HEIGHT: 65 IN | DIASTOLIC BLOOD PRESSURE: 70 MMHG | WEIGHT: 218 LBS | SYSTOLIC BLOOD PRESSURE: 124 MMHG | TEMPERATURE: 97 F | OXYGEN SATURATION: 96 % | RESPIRATION RATE: 18 BRPM | HEART RATE: 93 BPM | BODY MASS INDEX: 36.32 KG/M2

## 2018-08-20 DIAGNOSIS — J45.20 MILD INTERMITTENT ASTHMA WITHOUT COMPLICATION: ICD-10-CM

## 2018-08-20 DIAGNOSIS — J30.1 ALLERGIC RHINITIS DUE TO POLLEN, UNSPECIFIED SEASONALITY: Primary | ICD-10-CM

## 2018-08-20 LAB — BACTERIA THROAT CULT: ABNORMAL

## 2018-08-20 PROCEDURE — 99214 OFFICE O/P EST MOD 30 MIN: CPT | Performed by: PHYSICIAN ASSISTANT

## 2018-08-20 RX ORDER — LORATADINE 10 MG/1
10 TABLET ORAL DAILY
Qty: 90 TABLET | Refills: 0 | Status: SHIPPED | OUTPATIENT
Start: 2018-08-20 | End: 2018-08-21 | Stop reason: SDUPTHER

## 2018-08-20 RX ORDER — MONTELUKAST SODIUM 10 MG/1
10 TABLET ORAL
Qty: 90 TABLET | Refills: 0 | Status: SHIPPED | OUTPATIENT
Start: 2018-08-20 | End: 2018-09-20 | Stop reason: SDUPTHER

## 2018-08-20 NOTE — PATIENT INSTRUCTIONS
Start loratadine 10 mg daily  Start singular 10 mg at bedtime  Offered a nasal steroid spray but patient states that she has side effects with nose bleeds and prefers to hold on medication  Advised that it may take at least 4 weeks for the medications to take effect  Follow up with your primary care provider if there is no improvement in 4 weeks or any symptoms increase

## 2018-08-20 NOTE — PROGRESS NOTES
Assessment/Plan:    Urgent care note from August 17th has been reviewed    Patient Instructions   Start loratadine 10 mg daily  Start singular 10 mg at bedtime  Offered a nasal steroid spray but patient states that she has side effects with nose bleeds and prefers to hold on medication  Advised that it may take at least 4 weeks for the medications to take effect  Follow up with your primary care provider if there is no improvement in 4 weeks or any symptoms increase  M*Modal software was used to dictate this note  It may contain errors with dictating incorrect words/spelling  Please contact provider directly for any questions  Diagnoses and all orders for this visit:    Allergic rhinitis due to pollen, unspecified seasonality  -     loratadine (CLARITIN) 10 mg tablet; Take 1 tablet (10 mg total) by mouth daily  -     montelukast (SINGULAIR) 10 mg tablet; Take 1 tablet (10 mg total) by mouth daily at bedtime    Mild intermittent asthma without complication          Subjective:      Patient ID: Jerry Rapp is a 52 y o  female  Patient presents today for an acute visit for evaluation of postnasal drip, congestion  She was seen in the urgent care center 3 days ago and had a throat culture which came back negative  She has also been having some throat discomfort  Ear pain  Denies any fevers or chills  She relates her symptoms to allergies but is concerned about taking allergy medication because of drowsy side effects  She recently moved here from [place] and has found that her allergy symptoms have been more significant  She has been noticing the symptoms for approximately 1 week  She has been utilizing Robitussin over-the-counter as needed  She infrequently utilizes her rescue inhaler  Sore Throat    Associated symptoms include congestion, coughing and ear pain         The following portions of the patient's history were reviewed and updated as appropriate:   She  has a past medical history of Asthma and Gastritis  She   Patient Active Problem List    Diagnosis Date Noted    Chronic gastritis without bleeding 04/13/2018    Mild intermittent asthma without complication 05/87/8730    Allergic rhinitis 04/13/2018     She  has no past surgical history on file  Her family history includes Cancer in her mother; No Known Problems in her father  She  reports that she has been smoking  She has been smoking about 1 00 pack per day  She has never used smokeless tobacco  She reports that she does not drink alcohol or use drugs  Current Outpatient Prescriptions   Medication Sig Dispense Refill    loratadine (CLARITIN) 10 mg tablet Take 1 tablet (10 mg total) by mouth daily 90 tablet 0    montelukast (SINGULAIR) 10 mg tablet Take 1 tablet (10 mg total) by mouth daily at bedtime 90 tablet 0    omeprazole (PriLOSEC) 40 MG capsule Take 1 capsule (40 mg total) by mouth daily 90 capsule 1    VENTOLIN  (90 Base) MCG/ACT inhaler INHALE 1 PUFF BY MOUTH EVERY 6 HOURS 18 g 2     No current facility-administered medications for this visit  Current Outpatient Prescriptions on File Prior to Visit   Medication Sig    omeprazole (PriLOSEC) 40 MG capsule Take 1 capsule (40 mg total) by mouth daily    VENTOLIN  (90 Base) MCG/ACT inhaler INHALE 1 PUFF BY MOUTH EVERY 6 HOURS     No current facility-administered medications on file prior to visit  She is allergic to other; penicillin g; and penicillins       Review of Systems   Constitutional: Negative for chills and fever  HENT: Positive for congestion, ear pain and sore throat  Respiratory: Positive for cough  Objective:      /70 (BP Location: Right arm, Patient Position: Sitting, Cuff Size: Large)   Pulse 93   Temp (!) 97 °F (36 1 °C) (Tympanic)   Resp 18   Ht 5' 5" (1 651 m)   Wt 98 9 kg (218 lb)   SpO2 96%   Breastfeeding?  No   BMI 36 28 kg/m²          Physical Exam   Constitutional: She appears well-developed and well-nourished  No distress  HENT:   Head: Normocephalic and atraumatic  Right Ear: External ear normal    Left Ear: External ear normal    Mouth/Throat: Oropharynx is clear and moist    Neck: Neck supple  Cardiovascular: Normal rate, regular rhythm and normal heart sounds  No murmur heard  Pulmonary/Chest: Effort normal and breath sounds normal  No respiratory distress  She has no wheezes  She has no rales  Lymphadenopathy:     She has no cervical adenopathy

## 2018-08-21 DIAGNOSIS — J30.1 ALLERGIC RHINITIS DUE TO POLLEN, UNSPECIFIED SEASONALITY: ICD-10-CM

## 2018-08-21 RX ORDER — LORATADINE 10 MG/1
TABLET ORAL
Qty: 90 TABLET | Refills: 0 | Status: SHIPPED | OUTPATIENT
Start: 2018-08-21 | End: 2019-03-22

## 2018-08-22 ENCOUNTER — TELEPHONE (OUTPATIENT)
Dept: URGENT CARE | Facility: MEDICAL CENTER | Age: 48
End: 2018-08-22

## 2018-08-22 DIAGNOSIS — J02.0 STREP PHARYNGITIS: Primary | ICD-10-CM

## 2018-08-22 RX ORDER — AZITHROMYCIN 250 MG/1
TABLET, FILM COATED ORAL
Qty: 6 TABLET | Refills: 0 | Status: SHIPPED | OUTPATIENT
Start: 2018-08-22 | End: 2018-08-26

## 2018-09-20 ENCOUNTER — OFFICE VISIT (OUTPATIENT)
Dept: FAMILY MEDICINE CLINIC | Facility: CLINIC | Age: 48
End: 2018-09-20
Payer: COMMERCIAL

## 2018-09-20 VITALS
SYSTOLIC BLOOD PRESSURE: 122 MMHG | RESPIRATION RATE: 18 BRPM | BODY MASS INDEX: 36.15 KG/M2 | TEMPERATURE: 97.5 F | OXYGEN SATURATION: 98 % | DIASTOLIC BLOOD PRESSURE: 72 MMHG | HEIGHT: 65 IN | HEART RATE: 78 BPM | WEIGHT: 217 LBS

## 2018-09-20 DIAGNOSIS — J30.1 ALLERGIC RHINITIS DUE TO POLLEN, UNSPECIFIED SEASONALITY: ICD-10-CM

## 2018-09-20 DIAGNOSIS — J02.0 STREP THROAT: ICD-10-CM

## 2018-09-20 DIAGNOSIS — J02.9 SORE THROAT: Primary | ICD-10-CM

## 2018-09-20 LAB — S PYO AG THROAT QL: NEGATIVE

## 2018-09-20 PROCEDURE — 3008F BODY MASS INDEX DOCD: CPT | Performed by: PHYSICIAN ASSISTANT

## 2018-09-20 PROCEDURE — 87147 CULTURE TYPE IMMUNOLOGIC: CPT | Performed by: PHYSICIAN ASSISTANT

## 2018-09-20 PROCEDURE — 87070 CULTURE OTHR SPECIMN AEROBIC: CPT | Performed by: PHYSICIAN ASSISTANT

## 2018-09-20 PROCEDURE — 99213 OFFICE O/P EST LOW 20 MIN: CPT | Performed by: PHYSICIAN ASSISTANT

## 2018-09-20 PROCEDURE — 87880 STREP A ASSAY W/OPTIC: CPT | Performed by: PHYSICIAN ASSISTANT

## 2018-09-20 RX ORDER — AZITHROMYCIN 250 MG/1
TABLET, FILM COATED ORAL
Qty: 6 TABLET | Refills: 0 | Status: SHIPPED | OUTPATIENT
Start: 2018-09-20 | End: 2018-09-24

## 2018-09-20 RX ORDER — MONTELUKAST SODIUM 10 MG/1
10 TABLET ORAL
Qty: 90 TABLET | Refills: 0 | Status: SHIPPED | OUTPATIENT
Start: 2018-09-20 | End: 2019-03-22

## 2018-09-20 NOTE — ASSESSMENT & PLAN NOTE
Discussed with patient that there is an increase in ragweed right now, so it is recommended to be taking her allergy medication  This being be 1 of the causes of her sore throat at this time

## 2018-09-20 NOTE — PROGRESS NOTES
Assessment/Plan:    Allergic rhinitis  Discussed with patient that there is an increase in ragweed right now, so it is recommended to be taking her allergy medication  This being be 1 of the causes of her sore throat at this time  Rapid strep came back negative, white spots are noted on her throat  Possible exudates, but can't rule out tonsil stones  Will send over azithromycin for possible infection  Did instruct patient to try and use a toothbrush to see if white spots will be removed  If there is no improvement symptoms, would recommend following up with ENT  Diagnoses and all orders for this visit:    Sore throat  -     POCT rapid strepA  -     Throat culture  -     Ambulatory Referral to Otolaryngology; Future    Allergic rhinitis due to pollen, unspecified seasonality  -     montelukast (SINGULAIR) 10 mg tablet; Take 1 tablet (10 mg total) by mouth daily at bedtime    Strep throat  -     azithromycin (ZITHROMAX) 250 mg tablet; Take 2 tablets today then 1 tablet daily x 4 days          Subjective:      Patient ID: Ole Cabrera is a 52 y o  female  70-year-old female presenting with concerns of sore throat  Patient has been previously diagnosed with allergic rhinitis  States that she has been having increasing sore throat for the last 5 days  Over the last 2 days she has noticed increasing white spots her tonsils  States that she has nasal congestion, rhinorrhea  Denies any fever, chills, chest pain, shortness of breath  Has been taking Tylenol over-the-counter with some relief  Recently has not been taking her allergy medication  The following portions of the patient's history were reviewed and updated as appropriate:   She  has a past medical history of Asthma and Gastritis    She   Patient Active Problem List    Diagnosis Date Noted    Chronic gastritis without bleeding 04/13/2018    Mild intermittent asthma without complication 56/83/6416    Allergic rhinitis 04/13/2018 She  has no past surgical history on file  Her family history includes Cancer in her mother; No Known Problems in her father  She  reports that she has been smoking  She has been smoking about 1 00 pack per day  She has never used smokeless tobacco  She reports that she does not drink alcohol or use drugs  Current Outpatient Prescriptions   Medication Sig Dispense Refill    azithromycin (ZITHROMAX) 250 mg tablet Take 2 tablets today then 1 tablet daily x 4 days 6 tablet 0    loratadine (CLARITIN) 10 mg tablet TAKE 1 TABLET(10 MG) BY MOUTH DAILY 90 tablet 0    montelukast (SINGULAIR) 10 mg tablet Take 1 tablet (10 mg total) by mouth daily at bedtime 90 tablet 0    omeprazole (PriLOSEC) 40 MG capsule Take 1 capsule (40 mg total) by mouth daily 90 capsule 1    VENTOLIN  (90 Base) MCG/ACT inhaler INHALE 1 PUFF BY MOUTH EVERY 6 HOURS 18 g 2     No current facility-administered medications for this visit  She is allergic to other; penicillin g; and penicillins       Review of Systems   Constitutional: Negative for chills, fatigue and fever  HENT: Positive for congestion, postnasal drip, rhinorrhea and sore throat  Negative for ear discharge, ear pain and trouble swallowing  Eyes: Negative for itching  Respiratory: Negative for cough, chest tightness, shortness of breath and wheezing  Cardiovascular: Negative for chest pain  Objective:      /72 (BP Location: Right arm, Patient Position: Sitting, Cuff Size: Large)   Pulse 78   Temp 97 5 °F (36 4 °C) (Tympanic)   Resp 18   Ht 5' 5" (1 651 m)   Wt 98 4 kg (217 lb)   SpO2 98%   BMI 36 11 kg/m²          Physical Exam   Constitutional: She appears well-developed and well-nourished  No distress  HENT:   Right Ear: Hearing, tympanic membrane, external ear and ear canal normal    Left Ear: Hearing, tympanic membrane, external ear and ear canal normal    Nose: Mucosal edema and rhinorrhea present     Mouth/Throat: Mucous membranes are normal  Oropharyngeal exudate and posterior oropharyngeal edema present  Neck: Normal range of motion  Neck supple  Cardiovascular: Normal rate, regular rhythm and normal heart sounds  Exam reveals no gallop and no friction rub  No murmur heard  Pulmonary/Chest: Effort normal and breath sounds normal  No respiratory distress  She has no wheezes  She has no rales  Lymphadenopathy:     She has cervical adenopathy  Skin: She is not diaphoretic

## 2018-09-23 LAB — BACTERIA THROAT CULT: ABNORMAL

## 2018-10-12 DIAGNOSIS — J45.20 MILD INTERMITTENT ASTHMA WITHOUT COMPLICATION: ICD-10-CM

## 2018-10-18 DIAGNOSIS — K29.50 CHRONIC GASTRITIS WITHOUT BLEEDING, UNSPECIFIED GASTRITIS TYPE: ICD-10-CM

## 2018-10-18 RX ORDER — OMEPRAZOLE 40 MG/1
CAPSULE, DELAYED RELEASE ORAL
Qty: 90 CAPSULE | Refills: 0 | Status: SHIPPED | OUTPATIENT
Start: 2018-10-18 | End: 2019-01-17 | Stop reason: SDUPTHER

## 2018-11-05 ENCOUNTER — OFFICE VISIT (OUTPATIENT)
Dept: URGENT CARE | Facility: MEDICAL CENTER | Age: 48
End: 2018-11-05
Payer: COMMERCIAL

## 2018-11-05 VITALS
BODY MASS INDEX: 35.82 KG/M2 | OXYGEN SATURATION: 97 % | WEIGHT: 215 LBS | HEIGHT: 65 IN | SYSTOLIC BLOOD PRESSURE: 139 MMHG | TEMPERATURE: 98.2 F | RESPIRATION RATE: 18 BRPM | DIASTOLIC BLOOD PRESSURE: 66 MMHG | HEART RATE: 82 BPM

## 2018-11-05 DIAGNOSIS — J30.9 ALLERGIC RHINITIS, UNSPECIFIED SEASONALITY, UNSPECIFIED TRIGGER: ICD-10-CM

## 2018-11-05 DIAGNOSIS — J02.9 ACUTE PHARYNGITIS, UNSPECIFIED ETIOLOGY: Primary | ICD-10-CM

## 2018-11-05 PROCEDURE — 99213 OFFICE O/P EST LOW 20 MIN: CPT | Performed by: FAMILY MEDICINE

## 2018-11-05 RX ORDER — TRIAMCINOLONE ACETONIDE 55 UG/1
2 SPRAY, METERED NASAL DAILY
Qty: 60 ACT | Refills: 0 | Status: SHIPPED | OUTPATIENT
Start: 2018-11-05 | End: 2019-03-22

## 2018-11-06 NOTE — PATIENT INSTRUCTIONS
I prescribed Nasacort nasal spray-2 sprays in each nostril once daily, xylocaine viscous for sore throat  Advised patient to gargle with salt water and neck some hydrogen peroxide to cleanse tonsil stones left tonsil area as needed  Use rescue inhaler as needed  Follow up with primary care provider symptoms worsen  Pharyngitis   WHAT YOU NEED TO KNOW:   Pharyngitis, or sore throat, is inflammation of the tissues and structures in your pharynx (throat)  Pharyngitis is most often caused by bacteria  It may also be caused by a cold or flu virus  Other causes include smoking, allergies, or acid reflux  DISCHARGE INSTRUCTIONS:   Call 911 for any of the following:   · You have trouble breathing or swallowing because your throat is swollen or sore  Return to the emergency department if:   · You are drooling because it hurts too much to swallow  · Your fever is higher than 102? F (39?C) or lasts longer than 3 days  · You are confused  · You taste blood in your throat  Contact your healthcare provider if:   · Your throat pain gets worse  · You have a painful lump in your throat that does not go away after 5 days  · Your symptoms do not improve after 5 days  · You have questions or concerns about your condition or care  Medicines:  Viral pharyngitis will go away on its own without treatment  Your sore throat should start to feel better in 3 to 5 days for both viral and bacterial infections  You may need any of the following:  · Antibiotics  treat a bacterial infection  · NSAIDs , such as ibuprofen, help decrease swelling, pain, and fever  NSAIDs can cause stomach bleeding or kidney problems in certain people  If you take blood thinner medicine, always ask your healthcare provider if NSAIDs are safe for you  Always read the medicine label and follow directions  · Acetaminophen  decreases pain and fever  It is available without a doctor's order  Ask how much to take and how often to take it  Follow directions  Acetaminophen can cause liver damage if not taken correctly  · Take your medicine as directed  Contact your healthcare provider if you think your medicine is not helping or if you have side effects  Tell him or her if you are allergic to any medicine  Keep a list of the medicines, vitamins, and herbs you take  Include the amounts, and when and why you take them  Bring the list or the pill bottles to follow-up visits  Carry your medicine list with you in case of an emergency  Manage your symptoms:   · Gargle salt water  Mix ¼ teaspoon salt in an 8 ounce glass of warm water and gargle  This may help decrease swelling in your throat  · Drink liquids as directed  You may need to drink more liquids than usual  Liquids may help soothe your throat and prevent dehydration  Ask how much liquid to drink each day and which liquids are best for you  · Use a cool-steam humidifier  to help moisten the air in your room and calm your cough  · Soothe your throat  with cough drops, ice, soft foods, or popsicles  Prevent the spread of pharyngitis:  Cover your mouth and nose when you cough or sneeze  Do not share food or drinks  Wash your hands often  Use soap and water  If soap and water are unavailable, use an alcohol based hand   Follow up with your healthcare provider as directed:  Write down your questions so you remember to ask them during your visits  © 2017 [employer] Information is for End User's use only and may not be sold, redistributed or otherwise used for commercial purposes  All illustrations and images included in CareNotes® are the copyrighted property of [employer]  or [employer]  The above information is an  only  It is not intended as medical advice for individual conditions or treatments  Talk to your doctor, nurse or pharmacist before following any medical regimen to see if it is safe and effective for you    Allergic Rhinitis   WHAT YOU NEED TO KNOW:   Allergic rhinitis, or hay fever, is swelling of the inside of your nose  The swelling is a reaction to allergens in the air  An allergen can be anything that causes an allergic reaction  Allergies to weeds, grass, trees, or mold often cause seasonal allergic rhinitis  Indoor dust mites, cockroaches, pet dander, or mold can also cause allergic rhinitis  DISCHARGE INSTRUCTIONS:   Call 911 for the following:   · You have chest pain or shortness of breath  Return to the emergency department if:   · You have severe pain  · You cough up blood  Contact your healthcare provider if:   · You have a fever  · You have ear or sinus pain, or a headache  · Your symptoms get worse, even after treatment  · You have yellow, green, brown, or bloody mucus coming from your nose  · Your nose is bleeding or you have pain inside your nose  · You have trouble sleeping because of your symptoms  · You have questions or concerns about your condition or care  Medicines:   · Medicines  help decrease your symptoms and clear your stuffy nose  · Take your medicine as directed  Contact your healthcare provider if you think your medicine is not helping or if you have side effects  Tell him of her if you are allergic to any medicine  Keep a list of the medicines, vitamins, and herbs you take  Include the amounts, and when and why you take them  Bring the list or the pill bottles to follow-up visits  Carry your medicine list with you in case of an emergency  How to manage allergic rhinitis:  The best way to manage allergic rhinitis is to avoid allergens that can trigger your symptoms  Any of the following may help decrease your symptoms:  · Rinse your nose and sinuses  with a salt water solution or use a salt water nasal spray  This will help thin the mucus in your nose and rinse away pollen and dirt  It will also help reduce swelling so you can breathe normally   Ask your healthcare provider how often to rinse your nose  · Reduce exposure to dust mites  Wash sheets and towels in hot water every week  Cover your pillows and mattresses with allergen-free covers  Limit the number of stuffed animals and soft toys your child has  Wash your child's toys in hot water regularly  Vacuum weekly and use a vacuum  with an air filter  If possible, get rid of carpets and curtains  These collect dust and dust mites  · Reduce exposure to pollen  Keep windows and doors closed in your house and car  Stay inside when air pollution or the pollen count is high  Run your air conditioner on recycle, and change air filters often  Shower and wash your hair before bed every night to rinse away pollen  · Reduce exposure to pet dander  If possible, do not keep cats, dogs, birds, or other pets  If you do keep pets in your home, keep them out of bedrooms and carpeted rooms  Bathe them often  · Reduce exposure to mold  Do not spend time in basements  Choose artificial plants instead of live plants  Keep your home's humidity at less than 45%  Do not have ponds or standing water in your home or yard  · Do not smoke  Avoid others who smoke  Ask your healthcare provider for information if you currently smoke and need help to quit  Follow up with your healthcare provider as directed: You may need to see an allergist often to control your symptoms  Write down your questions so you remember to ask them during your visits  © 2017 [employer] Information is for End User's use only and may not be sold, redistributed or otherwise used for commercial purposes  All illustrations and images included in CareNotes® are the copyrighted property of [employer]  or [employer] Soria  The above information is an  only  It is not intended as medical advice for individual conditions or treatments   Talk to your doctor, nurse or pharmacist before following any medical regimen to see if it is safe and effective for you

## 2018-11-16 DIAGNOSIS — J45.20 MILD INTERMITTENT ASTHMA WITHOUT COMPLICATION: ICD-10-CM

## 2018-12-17 DIAGNOSIS — J45.20 MILD INTERMITTENT ASTHMA WITHOUT COMPLICATION: ICD-10-CM

## 2019-01-17 DIAGNOSIS — K29.50 CHRONIC GASTRITIS WITHOUT BLEEDING, UNSPECIFIED GASTRITIS TYPE: ICD-10-CM

## 2019-01-18 RX ORDER — OMEPRAZOLE 40 MG/1
CAPSULE, DELAYED RELEASE ORAL
Qty: 90 CAPSULE | Refills: 1 | Status: SHIPPED | OUTPATIENT
Start: 2019-01-18 | End: 2019-07-11 | Stop reason: SDUPTHER

## 2019-01-26 ENCOUNTER — APPOINTMENT (EMERGENCY)
Dept: RADIOLOGY | Facility: HOSPITAL | Age: 49
End: 2019-01-26

## 2019-01-26 ENCOUNTER — APPOINTMENT (EMERGENCY)
Dept: NON INVASIVE DIAGNOSTICS | Facility: HOSPITAL | Age: 49
End: 2019-01-26

## 2019-01-26 ENCOUNTER — HOSPITAL ENCOUNTER (EMERGENCY)
Facility: HOSPITAL | Age: 49
Discharge: HOME | End: 2019-01-26
Attending: EMERGENCY MEDICINE | Admitting: EMERGENCY MEDICINE

## 2019-01-26 VITALS
TEMPERATURE: 97.4 F | DIASTOLIC BLOOD PRESSURE: 70 MMHG | RESPIRATION RATE: 18 BRPM | WEIGHT: 198 LBS | SYSTOLIC BLOOD PRESSURE: 124 MMHG | BODY MASS INDEX: 32.95 KG/M2 | OXYGEN SATURATION: 96 % | HEART RATE: 68 BPM

## 2019-01-26 DIAGNOSIS — M79.671 RIGHT FOOT PAIN: Primary | ICD-10-CM

## 2019-01-26 PROCEDURE — 73630 X-RAY EXAM OF FOOT: CPT

## 2019-01-26 PROCEDURE — 93971 EXTREMITY STUDY: CPT

## 2019-01-26 PROCEDURE — 99284 EMERGENCY DEPT VISIT MOD MDM: CPT

## 2019-01-26 PROCEDURE — 93971 EXTREMITY STUDY: CPT | Performed by: SURGERY

## 2019-01-26 PROCEDURE — 96372 THER/PROPH/DIAG INJ SC/IM: CPT

## 2019-01-26 RX ORDER — KETOROLAC TROMETHAMINE 30 MG/ML
15 INJECTION, SOLUTION INTRAMUSCULAR; INTRAVENOUS ONCE
Status: COMPLETED | OUTPATIENT
Start: 2019-01-26 | End: 2019-01-26

## 2019-01-26 RX ORDER — NAPROXEN 500 MG/1
500 TABLET ORAL 2 TIMES DAILY WITH MEALS
Qty: 12 TABLET | Refills: 0 | Status: SHIPPED | OUTPATIENT
Start: 2019-01-26 | End: 2019-03-22

## 2019-01-26 RX ADMIN — KETOROLAC TROMETHAMINE 15 MG: 30 INJECTION, SOLUTION INTRAMUSCULAR at 09:47

## 2019-01-26 NOTE — DISCHARGE INSTRUCTIONS
Metatarsalgia   AMBULATORY CARE:   Metatarsalgia  is pain in the ball of your foot, near your second, third, and fourth toes  Common signs and symptoms of metatarsalgia:  Symptoms usually develop over time, but you may have sudden pain from an injury  You may have any of the following:  · Pain at the ball of your foot or near your toes that gets worse when you walk or stand, especially on hard surfaces    · Pain during exercises such as running    · Sharp or shooting pain in your toes that may get worse when you flex your toes    · Tingling or numbness in your toes    · Feeling like you are walking over rocks, or that you have a bruise    · A change in the way you walk because you try to avoid putting pressure on the ball of your foot  Contact your healthcare provider if:   · You develop knee, back, or hip pain  · You have more pain or redness in the foot  · You have questions or concerns about your condition or care  Treatment:  The cause of your metatarsalgia will be treated, if possible  You may also need any of the following:  · NSAIDs , such as ibuprofen, help decrease swelling, pain, and fever  This medicine is available with or without a doctor's order  NSAIDs can cause stomach bleeding or kidney problems in certain people  If you take blood thinner medicine, always ask if NSAIDs are safe for you  Always read the medicine label and follow directions  Do not give these medicines to children under 10months of age without direction from your child's healthcare provider  · Ultrasound  may be used to relieve your pain  Sound waves from the ultrasound can help send heat deeper into your tissues  · A steroid injection  may help decrease inflammation  · Surgery  may be needed if other treatments do not work  Surgery is used to align the bones near your toes  You may also need surgery to fix a problem such as hammertoe  Manage or prevent metatarsalgia:   · Rest your foot    If you play sports, you may not be able to do weight-bearing exercises  Examples include swimming and bike riding  Ask your healthcare provider which exercises are safe for you  · Apply ice as directed  Ice helps reduce pain and swelling  Use an ice pack, or put crushed ice in a plastic bag  Cover the pack or bag with a towel before you apply it to your foot  Apply ice for 15 to 20 minutes every hour, or as directed  · Use a cane or crutch if directed  These devices may help take pressure off your foot while it heals  · Wear proper shoes  Do not wear shoes that are narrow or tight  You may need to wear shoes that are wider than you usually wear  Choose shoes that do not have a raised heel  Shock-absorbing shoes can help prevent injury  These shoes will have extra support under your feet and toes  You can also add shoe cushions inside your shoes or to the bottoms of your feet, near your toes  The cushions may provide more support and make walking or standing more comfortable  Arch supports may help take pressure off your toes  · Reach or maintain a healthy weight  Extra weight can put pressure on your feet  Talk to your healthcare provider about a healthy weight for you  Your provider can help you create a safe weight loss plan if you are overweight  · Go to physical therapy if directed  A physical therapist can help improve your strength and range of motion  The therapist can also help you improve the way you walk to prevent metatarsalgia from happening again  Your therapist can also teach you exercises to help relieve your pain  Follow up with your healthcare provider as directed:  Write down your questions so you remember to ask them during your visits  © 2017 [facility] Information is for End User's use only and may not be sold, redistributed or otherwise used for commercial purposes   All illustrations and images included in CareNotes® are the copyrighted property of [employer]  or Ricky Soria  The above information is an  only  It is not intended as medical advice for individual conditions or treatments  Talk to your doctor, nurse or pharmacist before following any medical regimen to see if it is safe and effective for you  Plantar Fasciitis   WHAT YOU NEED TO KNOW:   Rest, ice, and foot supports can help your plantar fascia heal  You may need medicines to decrease swelling and pain  A physical therapist can teach you exercises to help improve movement and strength, and to decrease pain  DISCHARGE INSTRUCTIONS:   Contact your healthcare provider or podiatrist if:   · Your pain and swelling increase  · You develop knee, hip, or back pain  · You have questions or concerns about your condition or care  Medicines: You may  need any of the following:  · Acetaminophen  decreases pain and fever  It is available without a doctor's order  Ask how much to give your child and how often to give it  Follow directions  Read the labels of all other medicines your child uses to see if they also contain acetaminophen, or ask your child's doctor or pharmacist  Acetaminophen can cause liver damage if not taken correctly  · NSAIDs , such as ibuprofen, help decrease swelling, pain, and fever  NSAIDs can cause stomach bleeding or kidney problems in certain people  If you take blood thinner medicine, always ask your healthcare provider if NSAIDs are safe for you  Always read the medicine label and follow directions  · Take your medicine as directed  Contact your healthcare provider if you think your medicine is not helping or if you have side effects  Tell him of her if you are allergic to any medicine  Keep a list of the medicines, vitamins, and herbs you take  Include the amounts, and when and why you take them  Bring the list or the pill bottles to follow-up visits  Carry your medicine list with you in case of an emergency    Self-care:   · Wear your splint or shoe inserts as directed  You may need to wear a splint at night to keep your foot stretched while you sleep  This will help prevent sharp pain first thing in the morning  Shoe inserts will help decrease stress on your plantar fascia when you walk or exercise  · Rest as directed  Rest as much as possible to decrease swelling and prevent more damage  Ask your healthcare provider when you can return to your normal activities  · Apply ice on your plantar fascia  Ice helps prevent tissue damage and decreases swelling and pain  Fill a water bottle with water and freeze it  Wrap a towel around the bottle or cover it with a pillow case  Roll the water bottle under your foot for 10 minutes in the morning and after work  · Massage your plantar fascia as directed  This may help decrease swelling and pain  Roll a golf ball under your foot for 10 minutes  Repeat 3 times each day  · Go to physical therapy as directed  A physical therapist teaches you exercises to help improve movement and strength, and to decrease pain  Prevent plantar fasciitis:   · Maintain a healthy weight  This will help decrease stress on your feet  Ask your healthcare provider how much you should weigh  Ask him to help you create a weight loss plan if you are overweight  · Do low-impact exercises  Low-impact exercises decrease stress on your plantar fascia  Examples include swimming or bicycling  · Start new activities slowly  Increase the intensity and time gradually  · Wear shoes that fit well and support your arch  Replace your shoes before the padding or shock absorption wears out  Do not walk or  bare feet or sandals for long periods of time  · Stretch before you exercise  Ask your healthcare provider how to stretch your plantar fascia and calf muscles  Follow up with your healthcare provider or podiatrist as directed:  Write down your questions so you remember to ask them during your visits     © 2017 [facility] Information is for End User's use only and may not be sold, redistributed or otherwise used for commercial purposes  All illustrations and images included in CareNotes® are the copyrighted property of A [employer] Inc  or Ricky Soria  The above information is an  only  It is not intended as medical advice for individual conditions or treatments  Talk to your doctor, nurse or pharmacist before following any medical regimen to see if it is safe and effective for you

## 2019-01-26 NOTE — ED PROVIDER NOTES
History  Chief Complaint   Patient presents with    Foot Pain     Reports to have plantar R foot pain with radiation of pain to lateral side of foot and calf area  Pain for five days  Reports mass on lateral aspect of foot  The patient is a 51-year-old female with a past medical history of asthma, seasonal allergies, gastritis who presents for evaluation of right foot pain  She states she started with pain 5 days ago  There is no injury or trauma  She describes pain along the bottom of her foot and at the ball of her foot  She describes a sharp soreness  She states that occasionally she can feel a burning/tingling pain  Pain can radiate around to the top of her foot and up her calf  She states pain is gradually worsening  She does note that she wears for on supportive shoes and does a lot of standing and walking on her feet  She has no history of the same  She denies any injury or trauma  She tried icy Hot without relief  She states that when she was massaging her foot she felt a small lump to the arch of her foot  She is unsure if this has ever been here before  She states that she has been taking Motrin without significant relief  Her last dose was 8:00 p m  Ken Ingles She denies any redness, swelling, warmth  She denies fever, chills, nausea vomiting diarrhea, chest pain, shortness breath, abdominal pain, numbness or weakness, history of cancer, history of DVT or PE  She is not on any birth control or hormone replacement therapy  Prior to Admission Medications   Prescriptions Last Dose Informant Patient Reported? Taking?    Triamcinolone Acetonide 55 MCG/ACT AERO   No No   Si Act (110 mcg total) into each nostril daily for 30 days   VENTOLIN  (90 Base) MCG/ACT inhaler   No No   Sig: INHALE 1 PUFF BY MOUTH EVERY 6 HOURS   loratadine (CLARITIN) 10 mg tablet   No No   Sig: TAKE 1 TABLET(10 MG) BY MOUTH DAILY   Patient not taking: Reported on 2018   montelukast (SINGULAIR) 10 mg tablet   No No   Sig: Take 1 tablet (10 mg total) by mouth daily at bedtime   omeprazole (PriLOSEC) 40 MG capsule   No No   Sig: TAKE 1 CAPSULE(40 MG) BY MOUTH DAILY      Facility-Administered Medications: None       Past Medical History:   Diagnosis Date    Asthma     Gastritis        Past Surgical History:   Procedure Laterality Date    ENDOMETRIAL ABLATION DANIS Celis         Family History   Problem Relation Age of Onset    Cancer Mother     No Known Problems Father      I have reviewed and agree with the history as documented  Social History   Substance Use Topics    Smoking status: Current Every Day Smoker     Packs/day: 1 00     Types: Cigarettes    Smokeless tobacco: Never Used    Alcohol use No        Review of Systems   Constitutional: Negative for chills and fever  Respiratory: Negative for shortness of breath  Cardiovascular: Negative for chest pain  Gastrointestinal: Negative for abdominal pain, diarrhea, nausea and vomiting  Musculoskeletal: Positive for arthralgias  Negative for joint swelling  Skin: Negative for color change, rash and wound  Neurological: Negative for weakness and numbness  All other systems reviewed and are negative  Physical Exam  Physical Exam   Constitutional: She is oriented to person, place, and time  Vital signs are normal  She appears well-developed and well-nourished  She is active  Non-toxic appearance  No distress  HENT:   Head: Normocephalic and atraumatic  Right Ear: External ear normal    Left Ear: External ear normal    Nose: Nose normal    Eyes: Conjunctivae and EOM are normal    Neck: Normal range of motion  Cardiovascular: Normal rate, regular rhythm and normal heart sounds  Exam reveals no gallop and no friction rub  No murmur heard  Pulmonary/Chest: Effort normal and breath sounds normal  No respiratory distress  She has no wheezes  She has no rales  Abdominal: Soft   Bowel sounds are normal  She exhibits no distension  There is no tenderness  There is no guarding  Musculoskeletal:        Right foot: There is tenderness and bony tenderness  There is normal range of motion, no swelling, normal capillary refill, no crepitus, no deformity and no laceration  Feet:    Reproducible tenderness to palpation over the plantar fascia, arch, and metatarsal (2-4) on the plantar aspect  No ankle pain  No significant swelling or pain to calf palpation  NVI distally  Capillary refill intact  Pedal pulses intact  Neurological: She is alert and oriented to person, place, and time  Skin: Skin is warm and dry  Capillary refill takes less than 2 seconds  No rash noted  She is not diaphoretic  No erythema  Psychiatric: She has a normal mood and affect  Her behavior is normal    Nursing note and vitals reviewed        Vital Signs  ED Triage Vitals   Temperature Pulse Respirations Blood Pressure SpO2   01/26/19 0910 01/26/19 0902 01/26/19 0902 01/26/19 0902 01/26/19 0902   (!) 97 4 °F (36 3 °C) 69 18 138/75 96 %      Temp Source Heart Rate Source Patient Position - Orthostatic VS BP Location FiO2 (%)   01/26/19 0910 01/26/19 1057 01/26/19 1057 01/26/19 1057 --   Oral Monitor Sitting Left arm       Pain Score       01/26/19 0902       Worst Possible Pain           Vitals:    01/26/19 0902 01/26/19 1057 01/26/19 1100   BP: 138/75 124/70 124/70   Pulse: 69 62 68   Patient Position - Orthostatic VS:  Sitting        Visual Acuity      ED Medications  Medications   ketorolac (TORADOL) injection 15 mg (15 mg Intramuscular Given 1/26/19 0947)       Diagnostic Studies  Results Reviewed     None                 VAS lower limb venous duplex study, unilateral/limited   Final Result by Carter Moreno MD (01/26 6854)      XR foot 3+ vw right    (Results Pending)              Procedures  Procedures       Phone Contacts  ED Phone Contact    ED Course                               MDM  Number of Diagnoses or Management Options  Right foot pain:   Diagnosis management comments: Plan- will check vasc us right lower ext and foot xray  Will give toradol for pain  11:40a- Vas us tech called, Neg  For DVT right lower ext  Foot xray reviewed by me and interpreted as no acute abnormality  Discussed results with patient  Explained that xrays will be further read by radiologist and if any discrepancies arise she will be contacted  She states that there is some improvement in pain after toradol  Discussed plantar fasciitis and metatarsalgia with the patient  Discussed supportive treatments  Will give post op shoe, crutches, and rx for naproxen  Given contact information for orthopedics and podiatry - call to schedule an appointment  Return to the ED if symptoms worsen or new symptoms arise as discussed  Patient states understanding and agrees with plan  Amount and/or Complexity of Data Reviewed  Tests in the radiology section of CPT®: ordered and reviewed  Independent visualization of images, tracings, or specimens: yes    Patient Progress  Patient progress: stable    CritCare Time    Disposition  Final diagnoses:   Right foot pain     Time reflects when diagnosis was documented in both MDM as applicable and the Disposition within this note     Time User Action Codes Description Comment    1/26/2019 11:49 AM Jessica Umanzor Add [M79 671] Right foot pain       ED Disposition     ED Disposition Condition Comment    Discharge  [facility] discharge to home/self care      Condition at discharge: Good        Follow-up Information     Follow up With Specialties Details Why Contact Info Additional Information    Darrel Goltz, PA-C Family Medicine, Physician Assistant In 1 day  [address]       [address] Orthopedic Surgery Schedule an appointment as soon as possible for a visit in 1 day  [facility] [phone]  [address] [place] [address] [facility] [address], [address] [facility] [address], [address], [facility], [address]    Comfort Kim DPM Podiatry Schedule an appointment as soon as possible for a visit  [address]  [address] [facility]  [address]       [facility] [address] Emergency Department Emergency Medicine  If symptoms worsen or new symptoms arise as discussed  [address] [facility]  [phone] [address] [place] [address] ED, [address] , [address], [facility], [address]          Discharge Medication List as of 1/26/2019 12:15 PM      START taking these medications    Details   naproxen (EC NAPROSYN) 500 MG EC tablet Take 1 tablet (500 mg total) by mouth 2 (two) times a day with meals, Starting Sat 1/26/2019, Print         CONTINUE these medications which have NOT CHANGED    Details   loratadine (CLARITIN) 10 mg tablet TAKE 1 TABLET(10 MG) BY MOUTH DAILY, Normal      montelukast (SINGULAIR) 10 mg tablet Take 1 tablet (10 mg total) by mouth daily at bedtime, Starting Thu 9/20/2018, Normal      omeprazole (PriLOSEC) 40 MG capsule TAKE 1 CAPSULE(40 MG) BY MOUTH DAILY, Normal      Triamcinolone Acetonide 55 MCG/ACT AERO 2 Act (110 mcg total) into each nostril daily for 30 days, Starting Mon 11/5/2018, Until Wed 12/5/2018, Normal      VENTOLIN  (90 Base) MCG/ACT inhaler INHALE 1 PUFF BY MOUTH EVERY 6 HOURS, Normal           No discharge procedures on file      ED Provider  Electronically Signed by           Kwabena Cruz PA-C  01/26/19 2009

## 2019-01-26 NOTE — ED NOTES
Pt reports Hx of ablasion and menopause, denies possible chance of pregnancy     Maxine Proctor RN  01/26/19 8143

## 2019-01-26 NOTE — ED NOTES
Patient reports not wanting crutches, discussed with provider and will provide surgical shoe for patient and no crutches        Ashley John RN  01/26/19 3422

## 2019-03-22 ENCOUNTER — OFFICE VISIT (OUTPATIENT)
Dept: URGENT CARE | Facility: MEDICAL CENTER | Age: 49
End: 2019-03-22

## 2019-03-22 VITALS
SYSTOLIC BLOOD PRESSURE: 122 MMHG | DIASTOLIC BLOOD PRESSURE: 78 MMHG | HEIGHT: 65 IN | TEMPERATURE: 98 F | WEIGHT: 205 LBS | OXYGEN SATURATION: 96 % | HEART RATE: 80 BPM | BODY MASS INDEX: 34.16 KG/M2 | RESPIRATION RATE: 16 BRPM

## 2019-03-22 DIAGNOSIS — B96.89 BACTERIAL SINUSITIS: Primary | ICD-10-CM

## 2019-03-22 DIAGNOSIS — J32.9 BACTERIAL SINUSITIS: Primary | ICD-10-CM

## 2019-03-22 PROCEDURE — G0383 LEV 4 HOSP TYPE B ED VISIT: HCPCS | Performed by: FAMILY MEDICINE

## 2019-03-22 RX ORDER — DOXYCYCLINE HYCLATE 100 MG/1
100 CAPSULE ORAL EVERY 12 HOURS SCHEDULED
Qty: 14 CAPSULE | Refills: 0 | Status: SHIPPED | OUTPATIENT
Start: 2019-03-22 | End: 2019-03-29

## 2019-03-23 NOTE — PROGRESS NOTES
[facility]        NAME: Vandana Lilly is a 50 y o  female  : 1970    MRN: [id number]  DATE: 2019  TIME: 8:34 PM    Assessment and Plan   Bacterial sinusitis [J32 9, B96 89]  1  Bacterial sinusitis  doxycycline hyclate (VIBRAMYCIN) 100 mg capsule     Demands antibiotics despite my long counseling    Patient Instructions       Follow up with PCP in 3-5 days  Proceed to  ER if symptoms worsen  Chief Complaint     Chief Complaint   Patient presents with    Nasal Congestion     Nasal congestion, bodyache, fatigue x 1 week  History of Present Illness       55-year-old female here with sinus congestion, sinus pressure postnasal drip for the past week or so  She denies any fevers or chills        Review of Systems   Review of Systems  As above    Current Medications       Current Outpatient Medications:     doxycycline hyclate (VIBRAMYCIN) 100 mg capsule, Take 1 capsule (100 mg total) by mouth every 12 (twelve) hours for 7 days, Disp: 14 capsule, Rfl: 0    omeprazole (PriLOSEC) 40 MG capsule, TAKE 1 CAPSULE(40 MG) BY MOUTH DAILY, Disp: 90 capsule, Rfl: 1    VENTOLIN  (90 Base) MCG/ACT inhaler, INHALE 1 PUFF BY MOUTH EVERY 6 HOURS, Disp: 18 g, Rfl: 2    Current Allergies     Allergies as of 2019 - Reviewed 2019   Allergen Reaction Noted    Other  2017    Penicillin g  2017    Penicillins  2017            The following portions of the patient's history were reviewed and updated as appropriate: allergies, current medications, past family history, past medical history, past social history, past surgical history and problem list      Past Medical History:   Diagnosis Date    Asthma     Gastritis        Past Surgical History:   Procedure Laterality Date    ENDOMETRIAL ABLATION W/ Cem Siddharth         Family History   Problem Relation Age of Onset    Cancer Mother     No Known Problems Father          Medications have been verified  Objective   /78   Pulse 80   Temp 98 °F (36 7 °C)   Resp 16   Ht 5' 5" (1 651 m)   Wt 93 kg (205 lb)   SpO2 96%   BMI 34 11 kg/m²        Physical Exam     Physical Exam   Constitutional: She is oriented to person, place, and time  She appears well-developed and well-nourished  HENT:   Head: Normocephalic and atraumatic  Right Ear: External ear normal    Left Ear: External ear normal    Mouth/Throat: Oropharynx is clear and moist    Eyes: Conjunctivae are normal    Neck: Neck supple  Cardiovascular: Normal rate, regular rhythm and normal heart sounds  Pulmonary/Chest: Effort normal and breath sounds normal  No respiratory distress  She has no wheezes  She has no rales  Abdominal: Soft  Musculoskeletal: Normal range of motion  Neurological: She is alert and oriented to person, place, and time  Skin: Skin is warm and dry  Psychiatric: She has a normal mood and affect  Her behavior is normal    Nursing note and vitals reviewed

## 2019-04-25 DIAGNOSIS — J45.20 MILD INTERMITTENT ASTHMA WITHOUT COMPLICATION: ICD-10-CM

## 2019-04-25 RX ORDER — ALBUTEROL SULFATE 90 UG/1
AEROSOL, METERED RESPIRATORY (INHALATION)
Qty: 18 G | Refills: 0 | Status: SHIPPED | OUTPATIENT
Start: 2019-04-25 | End: 2019-05-28 | Stop reason: SDUPTHER

## 2019-05-28 DIAGNOSIS — J45.20 MILD INTERMITTENT ASTHMA WITHOUT COMPLICATION: ICD-10-CM

## 2019-05-28 RX ORDER — ALBUTEROL SULFATE 90 UG/1
AEROSOL, METERED RESPIRATORY (INHALATION)
Qty: 18 G | Refills: 0 | Status: SHIPPED | OUTPATIENT
Start: 2019-05-28 | End: 2019-06-25 | Stop reason: SDUPTHER

## 2019-06-25 DIAGNOSIS — J45.20 MILD INTERMITTENT ASTHMA WITHOUT COMPLICATION: ICD-10-CM

## 2019-06-25 RX ORDER — ALBUTEROL SULFATE 90 UG/1
AEROSOL, METERED RESPIRATORY (INHALATION)
Qty: 18 G | Refills: 0 | Status: SHIPPED | OUTPATIENT
Start: 2019-06-25 | End: 2019-07-19 | Stop reason: SDUPTHER

## 2019-07-11 DIAGNOSIS — K29.50 CHRONIC GASTRITIS WITHOUT BLEEDING, UNSPECIFIED GASTRITIS TYPE: ICD-10-CM

## 2019-07-11 RX ORDER — OMEPRAZOLE 40 MG/1
CAPSULE, DELAYED RELEASE ORAL
Qty: 30 CAPSULE | Refills: 0 | Status: SHIPPED | OUTPATIENT
Start: 2019-07-11 | End: 2019-08-08 | Stop reason: SDUPTHER

## 2019-07-19 DIAGNOSIS — J45.20 MILD INTERMITTENT ASTHMA WITHOUT COMPLICATION: ICD-10-CM

## 2019-07-19 RX ORDER — ALBUTEROL SULFATE 90 UG/1
AEROSOL, METERED RESPIRATORY (INHALATION)
Qty: 18 G | Refills: 0 | Status: SHIPPED | OUTPATIENT
Start: 2019-07-19 | End: 2019-09-01 | Stop reason: SDUPTHER

## 2019-08-08 DIAGNOSIS — K29.50 CHRONIC GASTRITIS WITHOUT BLEEDING, UNSPECIFIED GASTRITIS TYPE: ICD-10-CM

## 2019-08-09 RX ORDER — OMEPRAZOLE 40 MG/1
CAPSULE, DELAYED RELEASE ORAL
Qty: 30 CAPSULE | Refills: 0 | Status: SHIPPED | OUTPATIENT
Start: 2019-08-09 | End: 2019-09-07 | Stop reason: SDUPTHER

## 2019-09-01 ENCOUNTER — OFFICE VISIT (OUTPATIENT)
Dept: URGENT CARE | Facility: MEDICAL CENTER | Age: 49
End: 2019-09-01

## 2019-09-01 VITALS
SYSTOLIC BLOOD PRESSURE: 126 MMHG | TEMPERATURE: 97.2 F | HEART RATE: 94 BPM | RESPIRATION RATE: 20 BRPM | DIASTOLIC BLOOD PRESSURE: 79 MMHG | OXYGEN SATURATION: 97 %

## 2019-09-01 DIAGNOSIS — J45.20 MILD INTERMITTENT ASTHMA WITHOUT COMPLICATION: ICD-10-CM

## 2019-09-01 DIAGNOSIS — J45.20 MILD INTERMITTENT ASTHMA, UNSPECIFIED WHETHER COMPLICATED: Primary | ICD-10-CM

## 2019-09-01 PROCEDURE — G0381 LEV 2 HOSP TYPE B ED VISIT: HCPCS | Performed by: PHYSICIAN ASSISTANT

## 2019-09-01 RX ORDER — ALBUTEROL SULFATE 90 UG/1
2 AEROSOL, METERED RESPIRATORY (INHALATION) EVERY 6 HOURS PRN
Qty: 6.7 G | Refills: 0 | Status: SHIPPED | OUTPATIENT
Start: 2019-09-01 | End: 2020-01-17

## 2019-09-01 NOTE — PROGRESS NOTES
[facility]        NAME: Giancarlo Agarwal is a 50 y o  female  : 1970    MRN: [id number]  DATE: 2019  TIME: 4:12 PM    Assessment and Plan   Mild intermittent asthma, unspecified whether complicated [O86 55]  1  Mild intermittent asthma, unspecified whether complicated  albuterol (PROVENTIL HFA) 90 mcg/act inhaler         Patient Instructions     I discussed with her the need to talk with her PCP about adding another therapy since she is using her rescue inhaler on a daily basis  Follow up with PCP in 3-5 days  Proceed to  ER if symptoms worsen  Chief Complaint     Chief Complaint   Patient presents with    Cough     Pt with nasal congestion , states has history of allergies and "asthma acting up " since yesterday  Ran out of inhalers  History of Present Illness       Patient has been using albuterol daily  Needs a refill  Asthma   She complains of cough  This is a recurrent problem  The current episode started in the past 7 days  The problem occurs constantly  The cough is non-productive  Her past medical history is significant for asthma  Review of Systems   Review of Systems   Respiratory: Positive for cough            Current Medications       Current Outpatient Medications:     omeprazole (PriLOSEC) 40 MG capsule, TAKE 1 CAPSULE(40 MG) BY MOUTH DAILY, Disp: 30 capsule, Rfl: 0    albuterol (PROVENTIL HFA) 90 mcg/act inhaler, Inhale 2 puffs every 6 (six) hours as needed for wheezing, Disp: 6 7 g, Rfl: 0    albuterol (PROVENTIL HFA,VENTOLIN HFA) 90 mcg/act inhaler, INHALE 1 PUFF BY MOUTH EVERY 6 HOURS (Patient not taking: Reported on 2019), Disp: 18 g, Rfl: 0    Current Allergies     Allergies as of 2019 - Reviewed 2019   Allergen Reaction Noted    Other  2017    Penicillin g  2017    Penicillins  2017            The following portions of the patient's history were reviewed and updated as appropriate: allergies, current medications, past family history, past medical history, past social history, past surgical history and problem list      Past Medical History:   Diagnosis Date    Asthma     Gastritis        Past Surgical History:   Procedure Laterality Date    ENDOMETRIAL ABLATION W/ Martín Wall         Family History   Problem Relation Age of Onset    Cancer Mother     No Known Problems Father          Medications have been verified  Objective   /79 (BP Location: Right arm, Patient Position: Sitting, Cuff Size: Standard)   Pulse 94   Temp (!) 97 2 °F (36 2 °C) (Tympanic)   Resp 20   SpO2 97%        Physical Exam     Physical Exam   Constitutional: She appears well-developed and well-nourished  Cardiovascular: Normal rate, regular rhythm and normal heart sounds  Pulmonary/Chest: Effort normal     Expiratory wheezes at bases scattered

## 2019-09-03 RX ORDER — ALBUTEROL SULFATE 90 UG/1
AEROSOL, METERED RESPIRATORY (INHALATION)
Qty: 18 G | Refills: 0 | Status: SHIPPED | OUTPATIENT
Start: 2019-09-03 | End: 2019-09-17 | Stop reason: SDUPTHER

## 2019-09-06 DIAGNOSIS — K29.50 CHRONIC GASTRITIS WITHOUT BLEEDING, UNSPECIFIED GASTRITIS TYPE: ICD-10-CM

## 2019-09-06 RX ORDER — OMEPRAZOLE 40 MG/1
CAPSULE, DELAYED RELEASE ORAL
Qty: 30 CAPSULE | Refills: 0 | OUTPATIENT
Start: 2019-09-06

## 2019-09-06 NOTE — TELEPHONE ENCOUNTER
Patient called stating that she really needs the med Omeprazole 40 MG    A refill was requested from pharmacy earlier today

## 2019-09-07 DIAGNOSIS — K29.50 CHRONIC GASTRITIS WITHOUT BLEEDING, UNSPECIFIED GASTRITIS TYPE: ICD-10-CM

## 2019-09-09 ENCOUNTER — TELEPHONE (OUTPATIENT)
Dept: FAMILY MEDICINE CLINIC | Facility: CLINIC | Age: 49
End: 2019-09-09

## 2019-09-09 RX ORDER — OMEPRAZOLE 40 MG/1
CAPSULE, DELAYED RELEASE ORAL
Qty: 30 CAPSULE | Refills: 0 | Status: SHIPPED | OUTPATIENT
Start: 2019-09-09 | End: 2019-10-01 | Stop reason: SDUPTHER

## 2019-09-09 NOTE — TELEPHONE ENCOUNTER
Patient states if your willing to please send her medication she requested on the 9/7/19  Patient states she is aware that you stated she needs to be seen in the office  Patient wanted me to inform you that the only reason she had to reschedule her appt was because she showed up to [facility] location and patient was paying uber and didn't have enough money to pay uber to [facility] location and then back home  Patient has a schedule appt at the end of this month to see you and wanted to know if you can resend refills up until then       omeprazole (PriLOSEC) 40 MG capsule

## 2019-09-17 DIAGNOSIS — J45.20 MILD INTERMITTENT ASTHMA WITHOUT COMPLICATION: ICD-10-CM

## 2019-09-17 RX ORDER — ALBUTEROL SULFATE 90 UG/1
1 AEROSOL, METERED RESPIRATORY (INHALATION) EVERY 4 HOURS PRN
Qty: 18 G | Refills: 0 | Status: SHIPPED | OUTPATIENT
Start: 2019-09-17 | End: 2019-09-30 | Stop reason: SDUPTHER

## 2019-09-17 NOTE — TELEPHONE ENCOUNTER
Patient contacted our office stating her pharmacy had informed her they haven't received a script for albuterol ventolin that our office sent over 9/3  They are requesting a new script to be able to fill the script do to patient being low on inhaler

## 2019-09-30 DIAGNOSIS — J45.20 MILD INTERMITTENT ASTHMA WITHOUT COMPLICATION: ICD-10-CM

## 2019-10-01 DIAGNOSIS — K29.50 CHRONIC GASTRITIS WITHOUT BLEEDING, UNSPECIFIED GASTRITIS TYPE: ICD-10-CM

## 2019-10-01 RX ORDER — OMEPRAZOLE 40 MG/1
40 CAPSULE, DELAYED RELEASE ORAL DAILY
Qty: 30 CAPSULE | Refills: 0 | Status: SHIPPED | OUTPATIENT
Start: 2019-10-01 | End: 2019-10-25 | Stop reason: SDUPTHER

## 2019-10-01 RX ORDER — ALBUTEROL SULFATE 90 UG/1
AEROSOL, METERED RESPIRATORY (INHALATION)
Qty: 18 G | Refills: 0 | Status: SHIPPED | OUTPATIENT
Start: 2019-10-01 | End: 2020-01-17

## 2019-10-01 NOTE — TELEPHONE ENCOUNTER
Patient called stating needs the following medication called into pharmacy   Said her appt was canceled yesterday     omeprazole (PriLOSEC) 40 MG capsule

## 2019-10-04 ENCOUNTER — TELEPHONE (OUTPATIENT)
Dept: FAMILY MEDICINE CLINIC | Facility: CLINIC | Age: 49
End: 2019-10-04

## 2019-10-25 DIAGNOSIS — K29.50 CHRONIC GASTRITIS WITHOUT BLEEDING, UNSPECIFIED GASTRITIS TYPE: ICD-10-CM

## 2019-10-25 RX ORDER — OMEPRAZOLE 40 MG/1
CAPSULE, DELAYED RELEASE ORAL
Qty: 30 CAPSULE | Refills: 0 | Status: SHIPPED | OUTPATIENT
Start: 2019-10-25 | End: 2019-11-22 | Stop reason: SDUPTHER

## 2019-11-05 DIAGNOSIS — J45.20 MILD INTERMITTENT ASTHMA WITHOUT COMPLICATION: Primary | ICD-10-CM

## 2019-11-05 RX ORDER — ALBUTEROL SULFATE 90 UG/1
AEROSOL, METERED RESPIRATORY (INHALATION)
Qty: 18 G | Refills: 0 | Status: SHIPPED | OUTPATIENT
Start: 2019-11-05 | End: 2019-12-16 | Stop reason: SDUPTHER

## 2019-11-22 DIAGNOSIS — K29.50 CHRONIC GASTRITIS WITHOUT BLEEDING, UNSPECIFIED GASTRITIS TYPE: ICD-10-CM

## 2019-11-22 RX ORDER — OMEPRAZOLE 40 MG/1
CAPSULE, DELAYED RELEASE ORAL
Qty: 30 CAPSULE | Refills: 0 | Status: SHIPPED | OUTPATIENT
Start: 2019-11-22 | End: 2019-12-23 | Stop reason: SDUPTHER

## 2019-12-16 DIAGNOSIS — J45.20 MILD INTERMITTENT ASTHMA WITHOUT COMPLICATION: ICD-10-CM

## 2019-12-17 RX ORDER — ALBUTEROL SULFATE 90 UG/1
AEROSOL, METERED RESPIRATORY (INHALATION)
Qty: 18 G | Refills: 0 | Status: SHIPPED | OUTPATIENT
Start: 2019-12-17 | End: 2020-01-17 | Stop reason: SDUPTHER

## 2019-12-23 DIAGNOSIS — K29.50 CHRONIC GASTRITIS WITHOUT BLEEDING, UNSPECIFIED GASTRITIS TYPE: ICD-10-CM

## 2019-12-23 RX ORDER — OMEPRAZOLE 40 MG/1
CAPSULE, DELAYED RELEASE ORAL
Qty: 30 CAPSULE | Refills: 0 | Status: SHIPPED | OUTPATIENT
Start: 2019-12-23 | End: 2020-01-17 | Stop reason: SDUPTHER

## 2020-01-17 ENCOUNTER — OFFICE VISIT (OUTPATIENT)
Dept: FAMILY MEDICINE CLINIC | Facility: CLINIC | Age: 50
End: 2020-01-17

## 2020-01-17 VITALS
DIASTOLIC BLOOD PRESSURE: 76 MMHG | TEMPERATURE: 98 F | OXYGEN SATURATION: 93 % | RESPIRATION RATE: 16 BRPM | BODY MASS INDEX: 28.79 KG/M2 | SYSTOLIC BLOOD PRESSURE: 114 MMHG | WEIGHT: 173 LBS | HEART RATE: 103 BPM

## 2020-01-17 DIAGNOSIS — E66.3 OVERWEIGHT: ICD-10-CM
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
## 2020-01-17 DIAGNOSIS — Z13.220 SCREENING CHOLESTEROL LEVEL: ICD-10-CM

## 2020-01-17 DIAGNOSIS — J45.20 MILD INTERMITTENT ASTHMA WITHOUT COMPLICATION: Primary | ICD-10-CM

## 2020-01-17 DIAGNOSIS — M51.26 LUMBAR HERNIATED DISC: ICD-10-CM

## 2020-01-17 DIAGNOSIS — Z13.1 SCREENING FOR DIABETES MELLITUS: ICD-10-CM

## 2020-01-17 DIAGNOSIS — S80.862A INSECT BITE OF LEFT LOWER LEG, INITIAL ENCOUNTER: ICD-10-CM

## 2020-01-17 DIAGNOSIS — K29.50 CHRONIC GASTRITIS WITHOUT BLEEDING, UNSPECIFIED GASTRITIS TYPE: ICD-10-CM

## 2020-01-17 PROCEDURE — 99214 OFFICE O/P EST MOD 30 MIN: CPT | Performed by: PHYSICIAN ASSISTANT

## 2020-01-17 RX ORDER — OMEPRAZOLE 40 MG/1
40 CAPSULE, DELAYED RELEASE ORAL DAILY
Qty: 90 CAPSULE | Refills: 3 | Status: SHIPPED | OUTPATIENT
Start: 2020-01-17 | End: 2020-11-09 | Stop reason: SDUPTHER

## 2020-01-17 RX ORDER — METHOCARBAMOL 750 MG/1
750 TABLET, FILM COATED ORAL 4 TIMES DAILY PRN
COMMUNITY
Start: 2019-04-15 | End: 2020-11-09

## 2020-01-17 RX ORDER — ALBUTEROL SULFATE 90 UG/1
1 AEROSOL, METERED RESPIRATORY (INHALATION) EVERY 4 HOURS PRN
Qty: 18 G | Refills: 2 | Status: SHIPPED | OUTPATIENT
Start: 2020-01-17 | End: 2020-04-21

## 2020-01-17 RX ORDER — PREDNISONE 10 MG/1
TABLET ORAL
Qty: 30 TABLET | Refills: 0 | Status: SHIPPED | OUTPATIENT
Start: 2020-01-17 | End: 2020-11-09
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
## 2020-04-21 DIAGNOSIS — J45.20 MILD INTERMITTENT ASTHMA WITHOUT COMPLICATION: ICD-10-CM

## 2020-04-21 RX ORDER — ALBUTEROL SULFATE 90 UG/1
AEROSOL, METERED RESPIRATORY (INHALATION)
Qty: 18 G | Refills: 2 | Status: SHIPPED | OUTPATIENT
Start: 2020-04-21 | End: 2020-07-25 | Stop reason: SDUPTHER

## 2020-07-25 ENCOUNTER — NURSE TRIAGE (OUTPATIENT)
Dept: OTHER | Facility: OTHER | Age: 50
End: 2020-07-25

## 2020-07-25 DIAGNOSIS — J45.20 MILD INTERMITTENT ASTHMA WITHOUT COMPLICATION: ICD-10-CM

## 2020-07-25 RX ORDER — ALBUTEROL SULFATE 90 UG/1
1 AEROSOL, METERED RESPIRATORY (INHALATION) EVERY 4 HOURS PRN
Qty: 18 G | Refills: 0 | Status: SHIPPED | OUTPATIENT
Start: 2020-07-25 | End: 2020-09-23 | Stop reason: SDUPTHER

## 2020-07-25 NOTE — TELEPHONE ENCOUNTER
Regarding: Albuterol refill  ----- Message from Asmita Tolbert sent at 7/25/2020  9:47 AM EDT -----  "I need a refill of my albuterol inhaler immediately sent to [facility]   I only have two puffs left "

## 2020-07-25 NOTE — TELEPHONE ENCOUNTER
Reason for Disposition   Caller requesting a refill, no triage required, and triager able to refill per unit policy    Answer Assessment - Initial Assessment Questions  1     NAME of MEDICATION: "What medicine are you calling about?"      Albuterol inhaler, only 2 puffs left    Protocols used: MEDICATION QUESTION CALL-ADULT-

## 2020-07-27 RX ORDER — ALBUTEROL SULFATE 90 UG/1
AEROSOL, METERED RESPIRATORY (INHALATION)
Qty: 18 G | Refills: 0 | Status: SHIPPED | OUTPATIENT
Start: 2020-07-27 | End: 2020-11-09 | Stop reason: SDUPTHER

## 2020-09-23 DIAGNOSIS — J45.20 MILD INTERMITTENT ASTHMA WITHOUT COMPLICATION: ICD-10-CM

## 2020-09-23 RX ORDER — ALBUTEROL SULFATE 90 UG/1
1 AEROSOL, METERED RESPIRATORY (INHALATION) EVERY 4 HOURS PRN
Qty: 18 G | Refills: 0 | Status: SHIPPED | OUTPATIENT
Start: 2020-09-23 | End: 2020-10-26 | Stop reason: SDUPTHER

## 2020-09-23 RX ORDER — ALBUTEROL SULFATE 90 UG/1
AEROSOL, METERED RESPIRATORY (INHALATION)
Qty: 18 G | Refills: 0 | OUTPATIENT
Start: 2020-09-23

## 2020-10-26 DIAGNOSIS — J45.20 MILD INTERMITTENT ASTHMA WITHOUT COMPLICATION: ICD-10-CM

## 2020-10-26 RX ORDER — ALBUTEROL SULFATE 90 UG/1
1 AEROSOL, METERED RESPIRATORY (INHALATION) EVERY 4 HOURS PRN
Qty: 18 G | Refills: 0 | Status: SHIPPED | OUTPATIENT
Start: 2020-10-26 | End: 2020-12-23 | Stop reason: SDUPTHER

## 2020-11-09 ENCOUNTER — OFFICE VISIT (OUTPATIENT)
Dept: FAMILY MEDICINE CLINIC | Facility: CLINIC | Age: 50
End: 2020-11-09

## 2020-11-09 VITALS
RESPIRATION RATE: 18 BRPM | SYSTOLIC BLOOD PRESSURE: 122 MMHG | BODY MASS INDEX: 29.66 KG/M2 | OXYGEN SATURATION: 97 % | TEMPERATURE: 98.1 F | WEIGHT: 178 LBS | DIASTOLIC BLOOD PRESSURE: 64 MMHG | HEIGHT: 65 IN | HEART RATE: 92 BPM

## 2020-11-09 DIAGNOSIS — Z12.31 ENCOUNTER FOR SCREENING MAMMOGRAM FOR MALIGNANT NEOPLASM OF BREAST: ICD-10-CM

## 2020-11-09 DIAGNOSIS — Z11.4 ENCOUNTER FOR SCREENING FOR HIV: ICD-10-CM

## 2020-11-09 DIAGNOSIS — Z59.89 DOES NOT HAVE HEALTH INSURANCE: ICD-10-CM

## 2020-11-09 DIAGNOSIS — Z12.4 CERVICAL CANCER SCREENING: ICD-10-CM

## 2020-11-09 DIAGNOSIS — Z12.11 COLON CANCER SCREENING: ICD-10-CM

## 2020-11-09 DIAGNOSIS — K29.50 CHRONIC GASTRITIS WITHOUT BLEEDING, UNSPECIFIED GASTRITIS TYPE: Primary | ICD-10-CM

## 2020-11-09 DIAGNOSIS — J45.20 MILD INTERMITTENT ASTHMA WITHOUT COMPLICATION: ICD-10-CM

## 2020-11-09 DIAGNOSIS — E66.09 CLASS 1 OBESITY DUE TO EXCESS CALORIES WITHOUT SERIOUS COMORBIDITY WITH BODY MASS INDEX (BMI) OF 30.0 TO 30.9 IN ADULT: ICD-10-CM

## 2020-11-09 DIAGNOSIS — M51.26 LUMBAR HERNIATED DISC: ICD-10-CM

## 2020-11-09 DIAGNOSIS — J30.9 ALLERGIC RHINITIS, UNSPECIFIED SEASONALITY, UNSPECIFIED TRIGGER: ICD-10-CM

## 2020-11-09 PROBLEM — Z00.00 HEALTHCARE MAINTENANCE: Status: ACTIVE | Noted: 2020-11-09

## 2020-11-09 PROBLEM — Z59.71 DOES NOT HAVE HEALTH INSURANCE: Status: ACTIVE | Noted: 2020-11-09

## 2020-11-09 PROCEDURE — 99214 OFFICE O/P EST MOD 30 MIN: CPT | Performed by: NURSE PRACTITIONER

## 2020-11-09 RX ORDER — OMEPRAZOLE 40 MG/1
40 CAPSULE, DELAYED RELEASE ORAL DAILY
Qty: 90 CAPSULE | Refills: 3 | Status: SHIPPED | OUTPATIENT
Start: 2020-11-09 | End: 2020-12-31 | Stop reason: SDUPTHER

## 2020-11-09 RX ORDER — ALBUTEROL SULFATE 90 UG/1
1 AEROSOL, METERED RESPIRATORY (INHALATION) EVERY 6 HOURS PRN
Qty: 18 G | Refills: 0 | Status: SHIPPED | OUTPATIENT
Start: 2020-11-09 | End: 2021-01-22 | Stop reason: SDUPTHER

## 2020-11-09 SDOH — ECONOMIC STABILITY - INCOME SECURITY: OTHER PROBLEMS RELATED TO HOUSING AND ECONOMIC CIRCUMSTANCES: Z59.89

## 2020-12-01 ENCOUNTER — PATIENT OUTREACH (OUTPATIENT)
Dept: FAMILY MEDICINE CLINIC | Facility: CLINIC | Age: 50
End: 2020-12-01

## 2020-12-08 ENCOUNTER — PATIENT OUTREACH (OUTPATIENT)
Dept: FAMILY MEDICINE CLINIC | Facility: CLINIC | Age: 50
End: 2020-12-08

## 2020-12-23 DIAGNOSIS — J45.20 MILD INTERMITTENT ASTHMA WITHOUT COMPLICATION: ICD-10-CM

## 2020-12-23 RX ORDER — ALBUTEROL SULFATE 90 UG/1
1 AEROSOL, METERED RESPIRATORY (INHALATION) EVERY 4 HOURS PRN
Qty: 18 G | Refills: 0 | Status: SHIPPED | OUTPATIENT
Start: 2020-12-23 | End: 2021-04-29 | Stop reason: SDUPTHER

## 2020-12-30 DIAGNOSIS — K29.50 CHRONIC GASTRITIS WITHOUT BLEEDING, UNSPECIFIED GASTRITIS TYPE: ICD-10-CM

## 2020-12-31 RX ORDER — OMEPRAZOLE 40 MG/1
40 CAPSULE, DELAYED RELEASE ORAL DAILY
Qty: 90 CAPSULE | Refills: 3 | Status: SHIPPED | OUTPATIENT
Start: 2020-12-31 | End: 2021-11-16 | Stop reason: SDUPTHER

## 2021-01-22 DIAGNOSIS — J45.20 MILD INTERMITTENT ASTHMA WITHOUT COMPLICATION: ICD-10-CM

## 2021-01-22 RX ORDER — ALBUTEROL SULFATE 90 UG/1
1 AEROSOL, METERED RESPIRATORY (INHALATION) EVERY 6 HOURS PRN
Qty: 18 G | Refills: 0 | Status: SHIPPED | OUTPATIENT
Start: 2021-01-22 | End: 2021-02-15 | Stop reason: SDUPTHER

## 2021-02-15 DIAGNOSIS — J45.20 MILD INTERMITTENT ASTHMA WITHOUT COMPLICATION: ICD-10-CM

## 2021-02-15 RX ORDER — ALBUTEROL SULFATE 90 UG/1
1 AEROSOL, METERED RESPIRATORY (INHALATION) EVERY 6 HOURS PRN
Qty: 18 G | Refills: 1 | Status: SHIPPED | OUTPATIENT
Start: 2021-02-15 | End: 2021-06-14 | Stop reason: SDUPTHER

## 2021-03-28 ENCOUNTER — HOSPITAL ENCOUNTER (EMERGENCY)
Facility: HOSPITAL | Age: 51
Discharge: HOME | End: 2021-03-28
Attending: EMERGENCY MEDICINE
Payer: COMMERCIAL

## 2021-03-28 VITALS
SYSTOLIC BLOOD PRESSURE: 144 MMHG | TEMPERATURE: 98.2 F | BODY MASS INDEX: 31.33 KG/M2 | DIASTOLIC BLOOD PRESSURE: 63 MMHG | WEIGHT: 188.27 LBS | HEART RATE: 90 BPM | RESPIRATION RATE: 18 BRPM | OXYGEN SATURATION: 95 %

## 2021-03-28 DIAGNOSIS — F41.0 PANIC ATTACK: ICD-10-CM
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
## 2021-03-28 PROCEDURE — 99284 EMERGENCY DEPT VISIT MOD MDM: CPT | Performed by: EMERGENCY MEDICINE

## 2021-03-28 PROCEDURE — 96372 THER/PROPH/DIAG INJ SC/IM: CPT

## 2021-03-28 PROCEDURE — 99284 EMERGENCY DEPT VISIT MOD MDM: CPT

## 2021-03-28 RX ORDER — KETOROLAC TROMETHAMINE 30 MG/ML
30 INJECTION, SOLUTION INTRAMUSCULAR; INTRAVENOUS ONCE
Status: COMPLETED | OUTPATIENT
Start: 2021-03-28 | End: 2021-03-28

## 2021-03-28 RX ORDER — METHOCARBAMOL 500 MG/1
500 TABLET, FILM COATED ORAL ONCE
Status: DISCONTINUED | OUTPATIENT
Start: 2021-03-28 | End: 2021-03-28 | Stop reason: HOSPADM

## 2021-03-28 RX ADMIN — KETOROLAC TROMETHAMINE 30 MG: 30 INJECTION, SOLUTION INTRAMUSCULAR at 13:37
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
## 2021-04-29 DIAGNOSIS — J45.20 MILD INTERMITTENT ASTHMA WITHOUT COMPLICATION: ICD-10-CM

## 2021-04-29 RX ORDER — ALBUTEROL SULFATE 90 UG/1
1 AEROSOL, METERED RESPIRATORY (INHALATION) EVERY 4 HOURS PRN
Qty: 18 G | Refills: 0 | Status: SHIPPED | OUTPATIENT
Start: 2021-04-29 | End: 2021-05-14 | Stop reason: SDUPTHER

## 2021-05-14 ENCOUNTER — TELEPHONE (OUTPATIENT)
Dept: OTHER | Facility: OTHER | Age: 51
End: 2021-05-14

## 2021-05-14 DIAGNOSIS — J45.20 MILD INTERMITTENT ASTHMA WITHOUT COMPLICATION: ICD-10-CM

## 2021-05-14 RX ORDER — ALBUTEROL SULFATE 90 UG/1
1 AEROSOL, METERED RESPIRATORY (INHALATION) EVERY 4 HOURS PRN
Qty: 18 G | Refills: 0 | Status: SHIPPED | OUTPATIENT
Start: 2021-05-14 | End: 2021-08-20 | Stop reason: SDUPTHER

## 2021-05-14 NOTE — TELEPHONE ENCOUNTER
Pt called in stating she needs a refill on her inhaler and she wants refills on it  Pt stated she recently lost her last inhaler that was sent in at the end of April  Tasked to her PCP office for refills

## 2021-06-14 DIAGNOSIS — J45.20 MILD INTERMITTENT ASTHMA WITHOUT COMPLICATION: ICD-10-CM

## 2021-06-14 DIAGNOSIS — Z78.9 NEED FOR FOLLOW-UP BY SOCIAL WORKER: Primary | ICD-10-CM

## 2021-06-14 DIAGNOSIS — Z74.8 ASSISTANCE NEEDED WITH TRANSPORTATION: ICD-10-CM

## 2021-06-14 RX ORDER — ALBUTEROL SULFATE 90 UG/1
1 AEROSOL, METERED RESPIRATORY (INHALATION) EVERY 6 HOURS PRN
Qty: 18 G | Refills: 1 | Status: SHIPPED | OUTPATIENT
Start: 2021-06-14 | End: 2021-09-14 | Stop reason: SDUPTHER

## 2021-06-14 RX ORDER — ALBUTEROL SULFATE 90 UG/1
1 AEROSOL, METERED RESPIRATORY (INHALATION) EVERY 6 HOURS PRN
Qty: 18 G | Refills: 1 | Status: CANCELLED | OUTPATIENT
Start: 2021-06-14

## 2021-06-14 NOTE — TELEPHONE ENCOUNTER
Per pt, she uses [facility] on [address] as they deliver and she has no transportation  Called pharmacy to transfer Rx    Missed last several appointments due to lack of transportation  SW consult placed at this time  Also still does not have insurance  I asked her if she met with our financial counselors  She says they gave her a link? Did not complete because she couldn't get here? Will send this message to the group to follow up  Will wait to schedule appointment until her issues are resolved as she already has 3 no shows, I don't want her getting discharged from the practice  Did educate on cancelling appointments if unable to make it moving forward

## 2021-07-26 ENCOUNTER — PATIENT OUTREACH (OUTPATIENT)
Dept: FAMILY MEDICINE CLINIC | Facility: CLINIC | Age: 51
End: 2021-07-26

## 2021-07-26 NOTE — PROGRESS NOTES
DANIEL received referral from provider regarding transportation concerns discussed during Pts office visit  DANIEL contacted Pt at their mobile number, leaving a VM requesting that the Pt call DANIEL back

## 2021-07-28 ENCOUNTER — PATIENT OUTREACH (OUTPATIENT)
Dept: FAMILY MEDICINE CLINIC | Facility: CLINIC | Age: 51
End: 2021-07-28

## 2021-07-28 NOTE — PROGRESS NOTES
Care management has made attempts to reach pt regarding transportation barrier  Attempts to reach pt unsuccessful at this time  Referral will be closed however SW CM remains available if needed

## 2021-07-28 NOTE — LETTER
[address] [facility] [address]  [phone]    Re: Unable to reach   7/28/2021       Dear Jeffrey Nguyen,    We tried to reach you by phone and were unfortunately unable to reach you  It is important that you contact the [facility] [address] [phone]       Sincerely,         Lucho Sapp MSW, LSW

## 2021-08-12 ENCOUNTER — TELEPHONE (OUTPATIENT)
Dept: FAMILY MEDICINE CLINIC | Facility: CLINIC | Age: 51
End: 2021-08-12

## 2021-08-12 NOTE — TELEPHONE ENCOUNTER
I called pt back and no answer       Reviewing the pts chart I see she is scheduled next week with alis to discuss the vaccine

## 2021-08-20 DIAGNOSIS — J45.20 MILD INTERMITTENT ASTHMA WITHOUT COMPLICATION: ICD-10-CM

## 2021-08-20 RX ORDER — ALBUTEROL SULFATE 90 UG/1
1 AEROSOL, METERED RESPIRATORY (INHALATION) EVERY 4 HOURS PRN
Qty: 18 G | Refills: 0 | Status: SHIPPED | OUTPATIENT
Start: 2021-08-20 | End: 2021-11-16 | Stop reason: SDUPTHER

## 2021-09-14 DIAGNOSIS — J45.20 MILD INTERMITTENT ASTHMA WITHOUT COMPLICATION: ICD-10-CM

## 2021-09-14 RX ORDER — ALBUTEROL SULFATE 90 UG/1
1 AEROSOL, METERED RESPIRATORY (INHALATION) EVERY 6 HOURS PRN
Qty: 18 G | Refills: 1 | Status: SHIPPED | OUTPATIENT
Start: 2021-09-14 | End: 2021-11-16 | Stop reason: SDUPTHER

## 2021-11-16 ENCOUNTER — TELEPHONE (OUTPATIENT)
Dept: FAMILY MEDICINE CLINIC | Facility: CLINIC | Age: 51
End: 2021-11-16

## 2022-01-21 ENCOUNTER — OFFICE VISIT (OUTPATIENT)
Dept: FAMILY MEDICINE CLINIC | Facility: CLINIC | Age: 52
End: 2022-01-21

## 2022-01-21 ENCOUNTER — NURSE TRIAGE (OUTPATIENT)
Dept: OTHER | Facility: OTHER | Age: 52
End: 2022-01-21

## 2022-01-21 VITALS
HEIGHT: 65 IN | BODY MASS INDEX: 31.49 KG/M2 | WEIGHT: 189 LBS | DIASTOLIC BLOOD PRESSURE: 86 MMHG | OXYGEN SATURATION: 96 % | HEART RATE: 94 BPM | TEMPERATURE: 98 F | RESPIRATION RATE: 16 BRPM | SYSTOLIC BLOOD PRESSURE: 140 MMHG

## 2022-01-21 DIAGNOSIS — H60.91 OTITIS EXTERNA OF RIGHT EAR, UNSPECIFIED CHRONICITY, UNSPECIFIED TYPE: Primary | ICD-10-CM

## 2022-01-21 DIAGNOSIS — Z20.822 SUSPECTED COVID-19 VIRUS INFECTION: ICD-10-CM

## 2022-01-21 PROCEDURE — 99213 OFFICE O/P EST LOW 20 MIN: CPT | Performed by: FAMILY MEDICINE

## 2022-01-21 PROCEDURE — U0005 INFEC AGEN DETEC AMPLI PROBE: HCPCS | Performed by: FAMILY MEDICINE

## 2022-01-21 PROCEDURE — U0003 INFECTIOUS AGENT DETECTION BY NUCLEIC ACID (DNA OR RNA); SEVERE ACUTE RESPIRATORY SYNDROME CORONAVIRUS 2 (SARS-COV-2) (CORONAVIRUS DISEASE [COVID-19]), AMPLIFIED PROBE TECHNIQUE, MAKING USE OF HIGH THROUGHPUT TECHNOLOGIES AS DESCRIBED BY CMS-2020-01-R: HCPCS | Performed by: FAMILY MEDICINE

## 2022-01-21 RX ORDER — CIPROFLOXACIN 0.5 MG/.25ML
0.2 SOLUTION/ DROPS AURICULAR (OTIC) 2 TIMES DAILY
Qty: 14 EACH | Refills: 0 | Status: SHIPPED | OUTPATIENT
Start: 2022-01-21 | End: 2022-01-21 | Stop reason: ALTCHOICE

## 2022-01-21 RX ORDER — AMOXICILLIN AND CLAVULANATE POTASSIUM 875; 125 MG/1; MG/1
1 TABLET, FILM COATED ORAL EVERY 12 HOURS SCHEDULED
Qty: 14 TABLET | Refills: 0 | Status: SHIPPED | OUTPATIENT
Start: 2022-01-21 | End: 2022-01-28

## 2022-01-21 RX ORDER — OFLOXACIN 3 MG/ML
10 SOLUTION AURICULAR (OTIC) DAILY
Qty: 5 ML | Refills: 0 | Status: SHIPPED | OUTPATIENT
Start: 2022-01-21 | End: 2022-01-28

## 2022-01-21 NOTE — PROGRESS NOTES
Assessment/Plan:    Otitis externa of right ear  Right otitis externa as evidenced by it could now erythema and tenderness on exam  Not associated with any fevers or chill  Patient said that last night side of the ear was a little swollen however it has resolved now  There was no tenderness near the end cell bone  Will treat patient with ciprofloxacin drops  Patient is nervous and said that usually she gets treated with Augmentin which helped  -will also send Augmentin b i d  For 7 days  ER precaution given in review the patient  Will also test patient for COVID       Diagnoses and all orders for this visit:    Otitis externa of right ear, unspecified chronicity, unspecified type  -     Discontinue: Ciprofloxacin HCl 0 2 % otic solution; Administer 0 2 mL to the right ear 2 (two) times a day for 7 days  -     amoxicillin-clavulanate (AUGMENTIN) 875-125 mg per tablet; Take 1 tablet by mouth every 12 (twelve) hours for 7 days    Suspected COVID-19 virus infection  -     COVID Only - Collected at [place] [address] or [facility]; Future  -     COVID Only - Collected at [place] [address] or [facility]          Subjective:      Patient ID: Jeanella Romberg is a 46 y o  female  Earache   Pertinent negatives include no ear discharge, hearing loss or sore throat  Jeanella Romberg is a 46 y o  female who presents to the office for right ear pain  Reports that the pain started about 3 days ago with mild facial swollen that now resolved  The ear pain resolved however last night it came back again  She denies previous similar history of infection    Denies any recent swimming or history of diabetes  Denies any ear discharge, denies fever or chills    The following portions of the patient's history were reviewed and updated as appropriate: allergies, current medications, past family history, past medical history, past social history, past surgical history and problem list     Review of Systems   Constitutional: Negative for chills and fever    HENT: Positive for ear pain  Negative for drooling, ear discharge, facial swelling, hearing loss, mouth sores, sinus pressure, sinus pain, sore throat, tinnitus, trouble swallowing and voice change  Neurological: Negative for dizziness, facial asymmetry, weakness, light-headedness and numbness  Objective:      /86 (BP Location: Left arm, Patient Position: Sitting, Cuff Size: Standard)   Pulse 94   Temp 98 °F (36 7 °C) (Temporal)   Resp 16   Ht 5' 5" (1 651 m)   Wt 85 7 kg (189 lb)   SpO2 96%   BMI 31 45 kg/m²          Physical Exam  Constitutional:       General: She is not in acute distress  Appearance: Normal appearance  She is not ill-appearing or toxic-appearing  HENT:      Right Ear: No decreased hearing noted  Tenderness present  No laceration, drainage or swelling  No middle ear effusion  There is no impacted cerumen  No foreign body  No mastoid tenderness  Tympanic membrane is not perforated  Left Ear: No decreased hearing noted  No laceration, drainage, swelling or tenderness  No middle ear effusion  There is no impacted cerumen  No foreign body  No mastoid tenderness  Tympanic membrane is not perforated  Ears:     Pulmonary:      Effort: Pulmonary effort is normal    Neurological:      Mental Status: She is alert and oriented to person, place, and time

## 2022-01-21 NOTE — TELEPHONE ENCOUNTER
Patient states that veronica doesn't have any ciprofloaxcin that it is on national back order  She would like to know if there is a different ear drop she can start  On call provider messaged  As per provider she will take care of it  Reason for Disposition   Prescription request for new medicine (not a refill)    Answer Assessment - Initial Assessment Questions  1  NAME of MEDICATION: "What medicine are you calling about?"      Cipro drops not at pharmacy  2  QUESTION: "What is your question?" (e g , medication refill, side effect)      Can I have a different medication  3  PRESCRIBING HCP: "Who prescribed it?" Reason: if prescribed by specialist, call should be referred to that group  [facility]  4  SYMPTOMS: "Do you have any symptoms?"      Yes earache  5  SEVERITY: If symptoms are present, ask "Are they mild, moderate or severe?"      MIld  6   PREGNANCY:  "Is there any chance that you are pregnant?" "When was your last menstrual period?"      No    Protocols used: MEDICATION QUESTION CALL-ADULT-

## 2022-01-21 NOTE — LETTER
January 21, 2022     Patient: Moreno Jiménez   YOB: 1970   Date of Visit: 1/21/2022       To Whom it May Concern:    Moreno Jiménez is under my professional care  She was seen in my office on 1/21/2022  Please excuse her from work for this date  If you have any questions or concerns, please don't hesitate to call           Sincerely,          [facility]        CC: No Recipients

## 2022-01-21 NOTE — PATIENT INSTRUCTIONS
Otitis Externa, Ambulatory Care   GENERAL INFORMATION:   Otitis externa , or swimmer's ear, is an infection in the outer ear canal  This canal goes from the outside of the ear to the eardrum  Common symptoms include the following:   · Ear pain    · Outer ear canal is red and swollen    · Clear fluid or pus is leaking out of your ear    · Outer ear canal is itchy and you see a rash    · Trouble hearing because your ear is plugged    · Feel a bump in your ear canal, called a polyp    · Flakes of skin fall from your ear  Seek immediate care for the following symptoms:   · Fever    · Severe ear pain    · Sudden inability to hear at all    · New swelling in your face, behind your ears, or in your neck    · Sudden inability to move part of your face    · Sudden numbness in your face  Treatment for otitis externa  may include any of the following:  · NSAIDs  help decrease swelling and pain or fever  This medicine is available with or without a doctor's order  NSAIDs can cause stomach bleeding or kidney problems in certain people  If you take blood thinner medicine, always ask your healthcare provider if NSAIDs are safe for you  Always read the medicine label and follow directions  · Ear drops  are a combination of a steroid medicine and an antibiotic  The steroid helps decrease redness, swelling, and pain  The antibiotic helps kill the germs that caused your ear infection  · Ear wicking  may remove fluid or wax from your outer ear canal  Your healthcare provider may insert a small tube, called a wick, into your ear to help drain fluid  A wick also may be used to put medicine into your ear canal if the canal is blocked  Follow the steps below to use eardrops:   · Lie down on your side with your infected ear facing up  · Carefully drip the correct number of eardrops into your ear  Have another person help you if possible      · Gently move the outside part of your ear back and forth to help the medicine reach your ear canal      · Stay lying down in the same position (with your ear facing up) for 3 to 5 minutes  Prevent otitis externa:   · Do not put cotton swabs or foreign objects in your ears  · Wrap a clean moist washcloth around your finger, and use it to clean your outer ear and remove extra ear wax  · Use ear plugs when you swim  Dry your outer ears completely after you swim or bathe  Follow up with your healthcare provider as directed:  Write down your questions so you remember to ask them during your visits  CARE AGREEMENT:   You have the right to help plan your care  Learn about your health condition and how it may be treated  Discuss treatment options with your caregivers to decide what care you want to receive  You always have the right to refuse treatment  The above information is an  only  It is not intended as medical advice for individual conditions or treatments  Talk to your doctor, nurse or pharmacist before following any medical regimen to see if it is safe and effective for you  © 2014 [address] is for End User's use only and may not be sold, redistributed or otherwise used for commercial purposes  All illustrations and images included in CareNotes® are the copyrighted property of A D A M [employer] Inc  or Ricky Soria

## 2022-01-21 NOTE — TELEPHONE ENCOUNTER
Regarding: meicatioin problem  ----- Message from Cheryl Simmons sent at 1/21/2022  6:05 PM EST -----  "I was given a prescription that was order for me today the Ciprofloxacin I went to pick and was told was not available and I told I need to take today

## 2022-01-22 LAB — SARS-COV-2 RNA RESP QL NAA+PROBE: NEGATIVE

## 2022-02-21 ENCOUNTER — NURSE TRIAGE (OUTPATIENT)
Dept: OTHER | Facility: OTHER | Age: 52
End: 2022-02-21

## 2022-02-21 DIAGNOSIS — J45.20 MILD INTERMITTENT ASTHMA WITHOUT COMPLICATION: ICD-10-CM

## 2022-02-21 RX ORDER — ALBUTEROL SULFATE 90 UG/1
1 AEROSOL, METERED RESPIRATORY (INHALATION) EVERY 6 HOURS PRN
Qty: 18 G | Refills: 0 | Status: SHIPPED | OUTPATIENT
Start: 2022-02-21

## 2022-02-22 NOTE — TELEPHONE ENCOUNTER
Reason for Disposition   [1] Caller requesting a prescription renewal (no refills left), no triage required, AND [2] triager able to renew prescription per department policy    Answer Assessment - Initial Assessment Questions  1  NAME of MEDICATION: "What medicine are you calling about?"      Albuterol inhaler  2   QUESTION: "What is your question?" (e g , medication refill, side effect)      req a refill    Protocols used: MEDICATION QUESTION CALL-ADULT-

## 2022-02-22 NOTE — TELEPHONE ENCOUNTER
Regarding: Inhaler Refill  ----- Message from Catrina Mckinney sent at 2/21/2022  7:51 PM EST -----  "I need a refill on my inhaler   I only have 10 puffs right now and I'm using it a lot "

## 2022-03-28 DIAGNOSIS — J45.20 MILD INTERMITTENT ASTHMA WITHOUT COMPLICATION: ICD-10-CM

## 2022-03-28 RX ORDER — ALBUTEROL SULFATE 90 UG/1
1 AEROSOL, METERED RESPIRATORY (INHALATION) EVERY 4 HOURS PRN
Qty: 18 G | Refills: 0 | Status: SHIPPED | OUTPATIENT
Start: 2022-03-28 | End: 2022-04-27

## 2022-04-27 DIAGNOSIS — J45.20 MILD INTERMITTENT ASTHMA WITHOUT COMPLICATION: ICD-10-CM

## 2022-04-27 RX ORDER — ALBUTEROL SULFATE 90 UG/1
AEROSOL, METERED RESPIRATORY (INHALATION)
Qty: 18 G | Refills: 0 | Status: SHIPPED | OUTPATIENT
Start: 2022-04-27 | End: 2022-06-03

## 2022-05-03 ENCOUNTER — TELEPHONE (OUTPATIENT)
Dept: OBGYN CLINIC | Facility: CLINIC | Age: 52
End: 2022-05-03

## 2022-06-03 DIAGNOSIS — J45.20 MILD INTERMITTENT ASTHMA WITHOUT COMPLICATION: ICD-10-CM

## 2022-06-03 RX ORDER — ALBUTEROL SULFATE 90 UG/1
AEROSOL, METERED RESPIRATORY (INHALATION)
Qty: 18 G | Refills: 0 | Status: SHIPPED | OUTPATIENT
Start: 2022-06-03 | End: 2022-07-08

## 2022-07-08 DIAGNOSIS — J45.20 MILD INTERMITTENT ASTHMA WITHOUT COMPLICATION: ICD-10-CM

## 2022-07-08 RX ORDER — ALBUTEROL SULFATE 90 UG/1
AEROSOL, METERED RESPIRATORY (INHALATION)
Qty: 18 G | Refills: 0 | Status: SHIPPED | OUTPATIENT
Start: 2022-07-08 | End: 2022-08-09

## 2022-08-07 DIAGNOSIS — J45.20 MILD INTERMITTENT ASTHMA WITHOUT COMPLICATION: ICD-10-CM

## 2022-08-09 RX ORDER — ALBUTEROL SULFATE 90 UG/1
AEROSOL, METERED RESPIRATORY (INHALATION)
Qty: 18 G | Refills: 0 | Status: SHIPPED | OUTPATIENT
Start: 2022-08-09 | End: 2022-09-08

## 2022-09-07 DIAGNOSIS — J45.20 MILD INTERMITTENT ASTHMA WITHOUT COMPLICATION: ICD-10-CM

## 2022-09-08 RX ORDER — ALBUTEROL SULFATE 90 UG/1
AEROSOL, METERED RESPIRATORY (INHALATION)
Qty: 18 G | Refills: 0 | Status: SHIPPED | OUTPATIENT
Start: 2022-09-08

## 2022-09-09 ENCOUNTER — TELEPHONE (OUTPATIENT)
Dept: FAMILY MEDICINE CLINIC | Facility: CLINIC | Age: 52
End: 2022-09-09

## 2022-09-09 NOTE — TELEPHONE ENCOUNTER
Pt left voicemail requesting albuterol refill  I called back the pt and lvm informing the PCP sent the albuterol yesterday to the Pharmacy

## 2022-10-09 ENCOUNTER — OFFICE VISIT (OUTPATIENT)
Dept: URGENT CARE | Facility: MEDICAL CENTER | Age: 52
End: 2022-10-09

## 2022-10-09 VITALS
BODY MASS INDEX: 28.32 KG/M2 | TEMPERATURE: 98.1 F | RESPIRATION RATE: 20 BRPM | HEART RATE: 91 BPM | HEIGHT: 65 IN | WEIGHT: 170 LBS | DIASTOLIC BLOOD PRESSURE: 73 MMHG | OXYGEN SATURATION: 95 % | SYSTOLIC BLOOD PRESSURE: 134 MMHG

## 2022-10-09 DIAGNOSIS — J02.9 SORE THROAT: Primary | ICD-10-CM

## 2022-10-09 DIAGNOSIS — J40 BRONCHITIS: ICD-10-CM

## 2022-10-09 LAB — S PYO AG THROAT QL: NEGATIVE

## 2022-10-09 PROCEDURE — 87070 CULTURE OTHR SPECIMN AEROBIC: CPT | Performed by: FAMILY MEDICINE

## 2022-10-09 PROCEDURE — 99213 OFFICE O/P EST LOW 20 MIN: CPT | Performed by: FAMILY MEDICINE

## 2022-10-09 PROCEDURE — 87880 STREP A ASSAY W/OPTIC: CPT | Performed by: FAMILY MEDICINE

## 2022-10-09 RX ORDER — LIDOCAINE HYDROCHLORIDE 20 MG/ML
15 SOLUTION OROPHARYNGEAL 4 TIMES DAILY PRN
Qty: 100 ML | Refills: 0 | Status: SHIPPED | OUTPATIENT
Start: 2022-10-09

## 2022-10-09 RX ORDER — AMOXICILLIN AND CLAVULANATE POTASSIUM 875; 125 MG/1; MG/1
1 TABLET, FILM COATED ORAL EVERY 12 HOURS SCHEDULED
Qty: 20 TABLET | Refills: 0 | Status: SHIPPED | OUTPATIENT
Start: 2022-10-09 | End: 2022-10-19

## 2022-10-09 RX ORDER — ALBUTEROL SULFATE 90 UG/1
2 AEROSOL, METERED RESPIRATORY (INHALATION) EVERY 6 HOURS PRN
Qty: 19 G | Refills: 0 | Status: SHIPPED | OUTPATIENT
Start: 2022-10-09

## 2022-10-09 NOTE — PROGRESS NOTES
[facility]        NAME: Renuka May is a 46 y o  female  : 1970    MRN: [id number]  DATE: 2022  TIME: 10:06 AM    Assessment and Plan   Sore throat [J02 9]  1  Sore throat  POCT rapid strepA    Throat culture    amoxicillin-clavulanate (AUGMENTIN) 875-125 mg per tablet    albuterol (PROVENTIL HFA,VENTOLIN HFA) 90 mcg/act inhaler    Lidocaine Viscous HCl (XYLOCAINE) 2 % mucosal solution   2  Bronchitis  amoxicillin-clavulanate (AUGMENTIN) 875-125 mg per tablet    albuterol (PROVENTIL HFA,VENTOLIN HFA) 90 mcg/act inhaler    Lidocaine Viscous HCl (XYLOCAINE) 2 % mucosal solution         Patient Instructions       Follow up with PCP in 3-5 days  Proceed to  ER if symptoms worsen  Chief Complaint     Chief Complaint   Patient presents with   • Sore Throat     Patient presents with sore throat and slight cough for three days  History of Present Illness         66-year-old female with complaint of nasal congestion, has chest congestion and sore throat for 3 days  Implants chills bowel or any fever  Describes very painful to swallow and take all joints with no nodes or improvement  She has some shortness of breath and wheezing  She has been using albuterol inhaler several times a day with minimal improvement  She is crying taking Mucinex mass with minimal improvement  Denies any recent sick contacts  Review of Systems   Review of Systems   Constitutional: Positive for chills  HENT: Positive for congestion, postnasal drip, sinus pain and sore throat  Respiratory: Positive for cough and wheezing            Current Medications       Current Outpatient Medications:   •  albuterol (PROVENTIL HFA,VENTOLIN HFA) 90 mcg/act inhaler, Inhale 1 puff every 6 (six) hours as needed for wheezing, Disp: 18 g, Rfl: 0  •  albuterol (PROVENTIL HFA,VENTOLIN HFA) 90 mcg/act inhaler, Inhale 2 puffs every 6 (six) hours as needed for wheezing, Disp: 19 g, Rfl: 0  • amoxicillin-clavulanate (AUGMENTIN) 875-125 mg per tablet, Take 1 tablet by mouth every 12 (twelve) hours for 10 days, Disp: 20 tablet, Rfl: 0  •  Lidocaine Viscous HCl (XYLOCAINE) 2 % mucosal solution, Swish and spit 15 mL 4 (four) times a day as needed for mouth pain or discomfort, Disp: 100 mL, Rfl: 0  •  omeprazole (PriLOSEC) 40 MG capsule, Take 1 capsule (40 mg total) by mouth daily, Disp: 90 capsule, Rfl: 3  •  albuterol (PROVENTIL HFA,VENTOLIN HFA) 90 mcg/act inhaler, INHALE 1 PUFF BY MOUTH EVERY 4 HOURS AS NEEDED FOR WHEEZING (Patient not taking: Reported on 10/9/2022), Disp: 18 g, Rfl: 0    Current Allergies     Allergies as of 10/09/2022 - Reviewed 10/09/2022   Allergen Reaction Noted   • Other  12/14/2017   • Penicillin g  11/04/2017   • Penicillins  12/14/2017            The following portions of the patient's history were reviewed and updated as appropriate: allergies, current medications, past family history, past medical history, past social history, past surgical history and problem list      Past Medical History:   Diagnosis Date   • Anxiety    • Asthma    • Gastritis        Past Surgical History:   Procedure Laterality Date   • ENDOMETRIAL ABLATION W/ [address]         Family History   Problem Relation Age of Onset   • Cancer Mother    • No Known Problems Father          Medications have been verified  Objective   /73   Pulse 91   Temp 98 1 °F (36 7 °C)   Resp 20   Ht 5' 5" (1 651 m)   Wt 77 1 kg (170 lb)   LMP  (LMP Unknown)   SpO2 95%   BMI 28 29 kg/m²   No LMP recorded (lmp unknown)  Physical Exam     Physical Exam  Vitals and nursing note reviewed  Constitutional:       Appearance: She is well-developed  HENT:      Nose:      Comments: Hypertrophic turbinates     Mouth/Throat:      Pharynx: Pharyngeal swelling and posterior oropharyngeal erythema present  Tonsils: No tonsillar exudate  2+ on the right  2+ on the left     Pulmonary:      Effort: Pulmonary effort is normal       Comments: Decreased breath sound  Lymphadenopathy:      Cervical: Cervical adenopathy present  Neurological:      Mental Status: She is alert

## 2022-10-09 NOTE — PATIENT INSTRUCTIONS
Of history of test negative  Throat culture performed  Mucosa erythematous and hypertrophic tonsil , I have prescribed Augmentin 875 mg twice a day for attending, I refilled her Proventil inhaler and prescribe xylocaine viscous  Recommend patient continue with Mucinex as before  He was advised to quit smoking    Acute Bronchitis   WHAT YOU NEED TO KNOW:   Acute bronchitis is swelling and irritation in your lungs  It is usually caused by a virus and most often happens in the winter  Bronchitis may also be caused by bacteria or by a chemical irritant, such as smoke  DISCHARGE INSTRUCTIONS:   Return to the emergency department if:   You cough up blood  Your lips or fingernails turn blue  You feel like you are not getting enough air when you breathe  Call your doctor if:   Your symptoms do not go away or get worse, even after treatment  Your cough does not get better within 4 weeks  You have questions or concerns about your condition or care  Medicines: You may  need any of the following:  Cough suppressants  decrease your urge to cough  Decongestants  help loosen mucus in your lungs and make it easier to cough up  This can help you breathe easier  Inhalers  may be given  Your healthcare provider may give you one or more inhalers to help you breathe easier and cough less  An inhaler gives your medicine to open your airways  Ask your healthcare provider to show you how to use your inhaler correctly  Antibiotics  may be given for up to 5 days if your bronchitis is caused by bacteria  Acetaminophen  decreases pain and fever  It is available without a doctor's order  Ask how much to take and how often to take it  Follow directions  Read the labels of all other medicines you are using to see if they also contain acetaminophen, or ask your doctor or pharmacist  Acetaminophen can cause liver damage if not taken correctly   Do not use more than 4 grams (4,000 milligrams) total of acetaminophen in one day  NSAIDs  help decrease swelling and pain or fever  This medicine is available with or without a doctor's order  NSAIDs can cause stomach bleeding or kidney problems in certain people  If you take blood thinner medicine, always ask your healthcare provider if NSAIDs are safe for you  Always read the medicine label and follow directions  Take your medicine as directed  Contact your healthcare provider if you think your medicine is not helping or if you have side effects  Tell him of her if you are allergic to any medicine  Keep a list of the medicines, vitamins, and herbs you take  Include the amounts, and when and why you take them  Bring the list or the pill bottles to follow-up visits  Carry your medicine list with you in case of an emergency  Self-care:   Drink liquids as directed  You may need to drink more liquids than usual to stay hydrated  Ask how much liquid to drink each day and which liquids are best for you  Use a cool mist humidifier  to increase air moisture in your home  This may make it easier for you to breathe and help decrease your cough  Get more rest   Rest helps your body to heal  Slowly start to do more each day  Rest when you feel it is needed  Avoid irritants in the air  Avoid chemicals, fumes, and dust  Wear a face mask if you must work around dust or fumes  Stay inside on days when air pollution levels are high  If you have allergies, stay inside when pollen counts are high  Do not use aerosol products, such as spray-on deodorant, bug spray, and hair spray  Do not smoke or be around others who are smoking  Nicotine and other chemicals in cigarettes and cigars can cause lung damage  Ask your healthcare provider for information if you currently smoke and need help to quit  E-cigarettes or smokeless tobacco still contain nicotine  Talk to your healthcare provider before you use these products      Prevent acute bronchitis:       Ask about vaccines you may need  Get a flu vaccine each year as soon as recommended, usually in September or October  Ask your healthcare provider if you should also get a pneumonia or COVID-19 vaccine  Your healthcare provider can tell you if you should also get other vaccines, and when to get them  Prevent the spread of germs  You can decrease your risk for acute bronchitis and other illnesses by doing the following:     Wash your hands often with soap and water  Carry germ-killing hand lotion or gel with you  You can use the lotion or gel to clean your hands when soap and water are not available  Do not touch your eyes, nose, or mouth unless you have washed your hands first     Always cover your mouth when you cough to prevent the spread of germs  It is best to cough into a tissue or your shirt sleeve instead of into your hand  Ask those around you to cover their mouths when they cough  Try to avoid people who have a cold or the flu  If you are sick, stay away from others as much as possible  Follow up with your doctor as directed:  Write down questions you have so you will remember to ask them during your follow-up visits  © Copyright [employer] 2022 Information is for End User's use only and may not be sold, redistributed or otherwise used for commercial purposes  All illustrations and images included in CareNotes® are the copyrighted property of [employer]  or Natasha Tinajero  The above information is an  only  It is not intended as medical advice for individual conditions or treatments  Talk to your doctor, nurse or pharmacist before following any medical regimen to see if it is safe and effective for you

## 2022-10-10 DIAGNOSIS — K29.50 CHRONIC GASTRITIS WITHOUT BLEEDING, UNSPECIFIED GASTRITIS TYPE: ICD-10-CM

## 2022-10-10 DIAGNOSIS — J45.20 MILD INTERMITTENT ASTHMA WITHOUT COMPLICATION: ICD-10-CM

## 2022-10-11 LAB — BACTERIA THROAT CULT: NORMAL

## 2022-10-11 RX ORDER — ALBUTEROL SULFATE 90 UG/1
1 AEROSOL, METERED RESPIRATORY (INHALATION) EVERY 6 HOURS PRN
Qty: 18 G | Refills: 0 | Status: SHIPPED | OUTPATIENT
Start: 2022-10-11

## 2022-10-11 RX ORDER — OMEPRAZOLE 40 MG/1
40 CAPSULE, DELAYED RELEASE ORAL DAILY
Qty: 90 CAPSULE | Refills: 0 | Status: SHIPPED | OUTPATIENT
Start: 2022-10-11

## 2022-10-12 ENCOUNTER — OFFICE VISIT (OUTPATIENT)
Dept: FAMILY MEDICINE CLINIC | Facility: CLINIC | Age: 52
End: 2022-10-12

## 2022-10-12 VITALS
WEIGHT: 179.2 LBS | TEMPERATURE: 97.3 F | SYSTOLIC BLOOD PRESSURE: 162 MMHG | HEIGHT: 65 IN | BODY MASS INDEX: 29.85 KG/M2 | RESPIRATION RATE: 18 BRPM | HEART RATE: 90 BPM | DIASTOLIC BLOOD PRESSURE: 70 MMHG | OXYGEN SATURATION: 98 %

## 2022-10-12 DIAGNOSIS — J45.901 EXACERBATION OF ASTHMA, UNSPECIFIED ASTHMA SEVERITY, UNSPECIFIED WHETHER PERSISTENT: ICD-10-CM

## 2022-10-12 DIAGNOSIS — J45.20 MILD INTERMITTENT ASTHMA WITHOUT COMPLICATION: Primary | ICD-10-CM

## 2022-10-12 DIAGNOSIS — J06.9 UPPER RESPIRATORY TRACT INFECTION, UNSPECIFIED TYPE: ICD-10-CM

## 2022-10-12 PROCEDURE — 94640 AIRWAY INHALATION TREATMENT: CPT | Performed by: NURSE PRACTITIONER

## 2022-10-12 PROCEDURE — 96372 THER/PROPH/DIAG INJ SC/IM: CPT | Performed by: NURSE PRACTITIONER

## 2022-10-12 PROCEDURE — 99214 OFFICE O/P EST MOD 30 MIN: CPT | Performed by: NURSE PRACTITIONER

## 2022-10-12 RX ORDER — IPRATROPIUM BROMIDE AND ALBUTEROL SULFATE 2.5; .5 MG/3ML; MG/3ML
3 SOLUTION RESPIRATORY (INHALATION)
Status: DISCONTINUED | OUTPATIENT
Start: 2022-10-12 | End: 2022-10-12

## 2022-10-12 RX ORDER — METHYLPREDNISOLONE SODIUM SUCCINATE 125 MG/2ML
125 INJECTION, POWDER, LYOPHILIZED, FOR SOLUTION INTRAMUSCULAR; INTRAVENOUS ONCE
Status: COMPLETED | OUTPATIENT
Start: 2022-10-12 | End: 2022-10-12

## 2022-10-12 RX ORDER — IPRATROPIUM BROMIDE AND ALBUTEROL SULFATE 2.5; .5 MG/3ML; MG/3ML
3 SOLUTION RESPIRATORY (INHALATION) ONCE
Status: COMPLETED | OUTPATIENT
Start: 2022-10-12 | End: 2022-10-12

## 2022-10-12 RX ORDER — PREDNISONE 20 MG/1
60 TABLET ORAL DAILY
Qty: 15 TABLET | Refills: 0 | Status: SHIPPED | OUTPATIENT
Start: 2022-10-12 | End: 2022-10-17

## 2022-10-12 RX ORDER — BENZONATATE 200 MG/1
200 CAPSULE ORAL 3 TIMES DAILY PRN
Qty: 20 CAPSULE | Refills: 0 | Status: SHIPPED | OUTPATIENT
Start: 2022-10-12

## 2022-10-12 RX ORDER — FLUTICASONE PROPIONATE 50 MCG
1 SPRAY, SUSPENSION (ML) NASAL DAILY
Qty: 16 G | Refills: 0 | Status: SHIPPED | OUTPATIENT
Start: 2022-10-12

## 2022-10-12 RX ORDER — AZITHROMYCIN 250 MG/1
TABLET, FILM COATED ORAL
Qty: 6 TABLET | Refills: 0 | Status: SHIPPED | OUTPATIENT
Start: 2022-10-12 | End: 2022-10-16

## 2022-10-12 RX ADMIN — IPRATROPIUM BROMIDE AND ALBUTEROL SULFATE 3 ML: 2.5; .5 SOLUTION RESPIRATORY (INHALATION) at 11:41

## 2022-10-12 RX ADMIN — METHYLPREDNISOLONE SODIUM SUCCINATE 125 MG: 125 INJECTION, POWDER, LYOPHILIZED, FOR SOLUTION INTRAMUSCULAR; INTRAVENOUS at 11:57

## 2022-10-12 NOTE — PROGRESS NOTES
Assessment/Plan:    Dc Carmona was seen today for follow-up  Diagnoses and all orders for this visit:    Mild intermittent asthma without complication    Exacerbation of asthma, unspecified asthma severity, unspecified whether persistent  -     benzonatate (TESSALON) 200 MG capsule; Take 1 capsule (200 mg total) by mouth 3 (three) times a day as needed for cough  -     predniSONE 20 mg tablet; Take 3 tablets (60 mg total) by mouth daily for 5 days  -     Discontinue: ipratropium-albuterol (DUO-NEB) 0 5-2 5 mg/3 mL inhalation solution 3 mL  -     ipratropium-albuterol (DUO-NEB) 0 5-2 5 mg/3 mL inhalation solution 3 mL  -     fluticasone (FLONASE) 50 mcg/act nasal spray; 1 spray into each nostril daily  -     methylPREDNISolone sodium succinate (Solu-MEDROL) injection 125 mg    Upper respiratory tract infection, unspecified type  -     azithromycin (ZITHROMAX) 250 mg tablet; Take 2 tablets today then 1 tablet daily x 4 days    Patient received Solu-medrol and duo-neb in office due to coughing, wheezing, chest tightness in office  After administration sx improved  Stable to leave, vitals WNL  ED parameters discussed  Return for RTC in 2 weeks for BP check with nurse   Subjective:     Allen Pulido is a 46 y o  female who  has a past medical history of Anxiety, Asthma, and Gastritis  who presented to the office today for ED follow up         The following portions of the patient's history were reviewed and updated as appropriate: allergies, current medications, past family history, past medical history, past social history, past surgical history and problem list     Current Outpatient Medications on File Prior to Visit   Medication Sig Dispense Refill   • albuterol (PROVENTIL HFA,VENTOLIN HFA) 90 mcg/act inhaler INHALE 1 PUFF BY MOUTH EVERY 4 HOURS AS NEEDED FOR WHEEZING (Patient not taking: Reported on 10/9/2022) 18 g 0   • albuterol (PROVENTIL HFA,VENTOLIN HFA) 90 mcg/act inhaler Inhale 2 puffs every 6 (six) hours as needed for wheezing 19 g 0   • albuterol (PROVENTIL HFA,VENTOLIN HFA) 90 mcg/act inhaler Inhale 1 puff every 6 (six) hours as needed for wheezing 18 g 0   • amoxicillin-clavulanate (AUGMENTIN) 875-125 mg per tablet Take 1 tablet by mouth every 12 (twelve) hours for 10 days 20 tablet 0   • Lidocaine Viscous HCl (XYLOCAINE) 2 % mucosal solution Swish and spit 15 mL 4 (four) times a day as needed for mouth pain or discomfort 100 mL 0   • omeprazole (PriLOSEC) 40 MG capsule Take 1 capsule (40 mg total) by mouth daily 90 capsule 0     No current facility-administered medications on file prior to visit  Review of Systems   Constitutional: Negative for chills and fever  HENT: Positive for congestion  Negative for ear pain and sore throat  Eyes: Negative for pain and visual disturbance  Respiratory: Positive for cough, chest tightness, shortness of breath and wheezing  Cardiovascular: Negative for chest pain and palpitations  Gastrointestinal: Negative for abdominal pain and vomiting  Genitourinary: Negative for dysuria and hematuria  Musculoskeletal: Negative for arthralgias and back pain  Skin: Negative for color change and rash  Neurological: Negative for seizures and syncope  All other systems reviewed and are negative  Objective:    /70 (BP Location: Left arm, Patient Position: Sitting, Cuff Size: Standard)   Pulse 90   Temp (!) 97 3 °F (36 3 °C) (Temporal)   Resp 18   Ht 5' 5" (1 651 m)   Wt 81 3 kg (179 lb 3 2 oz)   LMP  (LMP Unknown)   SpO2 98%   BMI 29 82 kg/m²     Physical Exam  Vitals and nursing note reviewed  Constitutional:       General: She is in acute distress  Appearance: She is well-developed  HENT:      Head: Normocephalic and atraumatic  Right Ear: External ear normal       Left Ear: External ear normal       Nose: Congestion present     Eyes:      Conjunctiva/sclera: Conjunctivae normal       Pupils: Pupils are equal, round, and reactive to light  Cardiovascular:      Rate and Rhythm: Normal rate and regular rhythm  Pulmonary:      Effort: Pulmonary effort is normal  No respiratory distress  Breath sounds: Wheezing present  Comments: Decreased bases   Abdominal:      General: Bowel sounds are normal  There is no distension  Palpations: Abdomen is soft  Tenderness: There is no abdominal tenderness  Musculoskeletal:         General: No deformity  Normal range of motion  Cervical back: Normal range of motion and neck supple  Lymphadenopathy:      Cervical: No cervical adenopathy  Skin:     General: Skin is warm and dry  Capillary Refill: Capillary refill takes less than 2 seconds  Neurological:      Mental Status: She is alert and oriented to person, place, and time     Psychiatric:         Behavior: Behavior normal          Shaista Gonzáles  10/13/22  9:32 AM

## 2022-10-12 NOTE — LETTER
October 12, 2022     Patient: Moreno Jiménez   YOB: 1970   Date of Visit: 10/12/2022       To Whom it May Concern:    Moreno Jiménez was seen in my clinic on 10/12/2022  She may return to work on 10/14/2022  If you have any questions or concerns, please don't hesitate to call           Sincerely,          TERESA Vázquez        CC: No Recipients

## 2022-10-15 DIAGNOSIS — T78.40XA ALLERGIC REACTION, INITIAL ENCOUNTER: Primary | ICD-10-CM

## 2022-10-15 RX ORDER — HYDROXYZINE 50 MG/1
50 TABLET, FILM COATED ORAL 3 TIMES DAILY PRN
Qty: 30 TABLET | Refills: 0 | Status: SHIPPED | OUTPATIENT
Start: 2022-10-15

## 2022-10-15 RX ORDER — FAMOTIDINE 40 MG/1
40 TABLET, FILM COATED ORAL DAILY
Qty: 30 TABLET | Refills: 0 | Status: SHIPPED | OUTPATIENT
Start: 2022-10-15 | End: 2023-05-01

## 2022-11-07 DIAGNOSIS — J45.20 MILD INTERMITTENT ASTHMA WITHOUT COMPLICATION: ICD-10-CM

## 2022-11-09 ENCOUNTER — TELEPHONE (OUTPATIENT)
Dept: FAMILY MEDICINE CLINIC | Facility: CLINIC | Age: 52
End: 2022-11-09

## 2022-11-09 NOTE — TELEPHONE ENCOUNTER
Pt called in requesting a dental referral to the [facility] on [address]  Pt is asking if this can be done as soon as you as she is experiencing a lot of pain in her teeth

## 2022-11-10 ENCOUNTER — OFFICE VISIT (OUTPATIENT)
Dept: URGENT CARE | Age: 52
End: 2022-11-10

## 2022-11-10 VITALS
HEART RATE: 76 BPM | BODY MASS INDEX: 29.16 KG/M2 | OXYGEN SATURATION: 97 % | DIASTOLIC BLOOD PRESSURE: 74 MMHG | SYSTOLIC BLOOD PRESSURE: 145 MMHG | RESPIRATION RATE: 17 BRPM | WEIGHT: 175 LBS | HEIGHT: 65 IN | TEMPERATURE: 97.5 F

## 2022-11-10 DIAGNOSIS — K08.89 PAIN, DENTAL: Primary | ICD-10-CM

## 2022-11-10 DIAGNOSIS — Z91.89 NEED FOR DENTAL CARE: Primary | ICD-10-CM

## 2022-11-10 RX ORDER — LIDOCAINE HYDROCHLORIDE 20 MG/ML
15 SOLUTION OROPHARYNGEAL 4 TIMES DAILY PRN
Qty: 100 ML | Refills: 0 | Status: SHIPPED | OUTPATIENT
Start: 2022-11-10

## 2022-11-10 RX ORDER — ALBUTEROL SULFATE 90 UG/1
1 AEROSOL, METERED RESPIRATORY (INHALATION) EVERY 6 HOURS PRN
Qty: 18 G | Refills: 0 | Status: SHIPPED | OUTPATIENT
Start: 2022-11-10

## 2022-11-10 RX ORDER — CLINDAMYCIN HYDROCHLORIDE 300 MG/1
300 CAPSULE ORAL 3 TIMES DAILY
Qty: 21 CAPSULE | Refills: 0 | Status: SHIPPED | OUTPATIENT
Start: 2022-11-10 | End: 2022-11-17

## 2022-11-10 NOTE — LETTER
November 10, 2022     Patient: Miguelito Zhu   YOB: 1970   Date of Visit: 11/10/2022       To Whom it May Concern:    Miguelito Zhu was seen in my clinic on 11/10/2022  Please excuse her from work today  Thank you         Sincerely,              Eduarda Rivera

## 2022-11-10 NOTE — PATIENT INSTRUCTIONS
Start antibiotic as directed  Lidocaine as needed  Referral placed for Dentist     Follow up with PCP in 3-5 days  Proceed to ER if symptoms worsen

## 2022-11-11 NOTE — PROGRESS NOTES
[facility]        NAME: Ruby Swartz is a 46 y o  female  : 1970    MRN: [id number]  DATE: November 10, 2022  TIME: 7:53 PM    Assessment and Plan   Pain, dental [K08 89]  1  Pain, dental  Ambulatory Referral to Dentistry    clindamycin (CLEOCIN) 300 MG capsule    Lidocaine Viscous HCl (XYLOCAINE) 2 % mucosal solution         Patient Instructions     Referral placed and paper copy given to patient  Lidocaine PRN  Clindamycin as directed  Follow up with PCP in 2-3 days  Proceed to ER if symptoms worsen  Chief Complaint     Chief Complaint   Patient presents with   • Dental Pain     Pt C/O pain in the upper left side of her mouth for approx 3 days  States that she tried to get into the [facility] clinic and she could not because she needed a referral   Has taken OTC motrin with some relief  History of Present Illness     46 y o  F presents with complaint of dental pain x 3 days  Denies CP or SOB  Denies fevers or chills  Denies trouble breathing or swallowing  States sensitivity with both hot and cold  +tobacco use  Recent tx for bronchitis  Pt states she took Amoxicillin - documentation reports Azithromycin  Pt states she had a bad reaction to the Zpak and her doctor prescribed her Augmentin  Unable to see documentation of this  States she called for dental referral today and was unable to get one  I reiterated to patient that this referral is present in her chart but she states the clinic never received it  Took Motrin today with some relief of dental pain  Review of Systems   Review of Systems   Constitutional: Negative for chills, fatigue and fever  HENT: Positive for dental problem  Negative for congestion, ear pain, facial swelling, hearing loss, rhinorrhea, sinus pressure, sneezing, sore throat and trouble swallowing  Eyes: Negative for pain, redness and visual disturbance     Respiratory: Negative for cough, chest tightness, shortness of breath and wheezing  Cardiovascular: Negative for chest pain and palpitations  Gastrointestinal: Negative for abdominal pain, diarrhea, nausea and vomiting  Genitourinary: Negative for dysuria, flank pain, hematuria and pelvic pain  Musculoskeletal: Negative for arthralgias, back pain and myalgias  Skin: Negative for color change and rash  Neurological: Negative for dizziness, seizures, syncope, weakness, light-headedness and headaches  Psychiatric/Behavioral: Negative for confusion, hallucinations and sleep disturbance  The patient is not nervous/anxious  All other systems reviewed and are negative          Current Medications       Current Outpatient Medications:   •  albuterol (PROVENTIL HFA,VENTOLIN HFA) 90 mcg/act inhaler, Inhale 2 puffs every 6 (six) hours as needed for wheezing, Disp: 19 g, Rfl: 0  •  clindamycin (CLEOCIN) 300 MG capsule, Take 1 capsule (300 mg total) by mouth 3 (three) times a day for 7 days, Disp: 21 capsule, Rfl: 0  •  fluticasone (FLONASE) 50 mcg/act nasal spray, 1 spray into each nostril daily, Disp: 16 g, Rfl: 0  •  Lidocaine Viscous HCl (XYLOCAINE) 2 % mucosal solution, Swish and spit 15 mL 4 (four) times a day as needed for mouth pain or discomfort, Disp: 100 mL, Rfl: 0  •  omeprazole (PriLOSEC) 40 MG capsule, Take 1 capsule (40 mg total) by mouth daily, Disp: 90 capsule, Rfl: 0  •  albuterol (PROVENTIL HFA,VENTOLIN HFA) 90 mcg/act inhaler, INHALE 1 PUFF BY MOUTH EVERY 4 HOURS AS NEEDED FOR WHEEZING, Disp: 18 g, Rfl: 0  •  albuterol (PROVENTIL HFA,VENTOLIN HFA) 90 mcg/act inhaler, Inhale 1 puff every 6 (six) hours as needed for wheezing, Disp: 18 g, Rfl: 0  •  benzonatate (TESSALON) 200 MG capsule, Take 1 capsule (200 mg total) by mouth 3 (three) times a day as needed for cough (Patient not taking: Reported on 11/10/2022), Disp: 20 capsule, Rfl: 0  •  famotidine (PEPCID) 40 MG tablet, Take 1 tablet (40 mg total) by mouth daily (Patient not taking: Reported on 11/10/2022), Disp: 30 tablet, Rfl: 0  •  hydrOXYzine HCL (ATARAX) 50 mg tablet, Take 1 tablet (50 mg total) by mouth 3 (three) times a day as needed for itching (Patient not taking: Reported on 11/10/2022), Disp: 30 tablet, Rfl: 0    Current Allergies     Allergies as of 11/10/2022 - Reviewed 11/10/2022   Allergen Reaction Noted   • Z-arabella [azithromycin] Rash 10/17/2022   • Other  12/14/2017   • Penicillin g  11/04/2017   • Penicillins  12/14/2017            The following portions of the patient's history were reviewed and updated as appropriate: allergies, current medications, past family history, past medical history, past social history, past surgical history and problem list      Past Medical History:   Diagnosis Date   • Anxiety    • Asthma    • Gastritis        Past Surgical History:   Procedure Laterality Date   • ENDOMETRIAL ABLATION W/ Yadiel Kee         Family History   Problem Relation Age of Onset   • Cancer Mother    • No Known Problems Father          Medications have been verified  Objective   /74   Pulse 76   Temp 97 5 °F (36 4 °C)   Resp 17   Ht 5' 5" (1 651 m)   Wt 79 4 kg (175 lb)   SpO2 97%   BMI 29 12 kg/m²   No LMP recorded  Physical Exam     Physical Exam  Vitals reviewed  Constitutional:       General: She is not in acute distress  Appearance: Normal appearance  She is not toxic-appearing  HENT:      Head: Normocephalic  Right Ear: Tympanic membrane normal  Tympanic membrane is not erythematous or bulging  Left Ear: Tympanic membrane normal  Tympanic membrane is not erythematous or bulging  Nose: No congestion or rhinorrhea  Right Sinus: No maxillary sinus tenderness or frontal sinus tenderness  Left Sinus: No maxillary sinus tenderness or frontal sinus tenderness  Mouth/Throat:      Dentition: Abnormal dentition  Dental caries present  Pharynx: Oropharynx is clear  Uvula midline   No oropharyngeal exudate, posterior oropharyngeal erythema or uvula swelling  Tonsils: No tonsillar exudate or tonsillar abscesses  Comments:   No visualized abscess  +decay  Eyes:      Extraocular Movements: Extraocular movements intact  Conjunctiva/sclera: Conjunctivae normal       Pupils: Pupils are equal, round, and reactive to light  Cardiovascular:      Rate and Rhythm: Normal rate and regular rhythm  Pulses: Normal pulses  Heart sounds: Normal heart sounds  Pulmonary:      Effort: Pulmonary effort is normal  No tachypnea or respiratory distress  Breath sounds: Normal breath sounds and air entry  No decreased breath sounds, wheezing, rhonchi or rales  Abdominal:      General: Bowel sounds are normal       Palpations: Abdomen is soft  Tenderness: There is no abdominal tenderness  There is no right CVA tenderness or guarding  Musculoskeletal:         General: Normal range of motion  Cervical back: Normal range of motion  Lymphadenopathy:      Cervical: No cervical adenopathy  Skin:     General: Skin is warm and dry  Neurological:      General: No focal deficit present  Mental Status: She is alert  Cranial Nerves: Cranial nerves are intact  Sensory: Sensation is intact  Motor: Motor function is intact  Coordination: Coordination is intact  Gait: Gait is intact  Deep Tendon Reflexes: Reflexes are normal and symmetric

## 2022-12-01 DIAGNOSIS — J02.9 SORE THROAT: ICD-10-CM

## 2022-12-01 DIAGNOSIS — J40 BRONCHITIS: ICD-10-CM

## 2022-12-01 RX ORDER — ALBUTEROL SULFATE 90 UG/1
2 AEROSOL, METERED RESPIRATORY (INHALATION) EVERY 6 HOURS PRN
Qty: 19 G | Refills: 0 | Status: SHIPPED | OUTPATIENT
Start: 2022-12-01

## 2022-12-25 DIAGNOSIS — J02.9 SORE THROAT: ICD-10-CM

## 2022-12-25 DIAGNOSIS — J40 BRONCHITIS: ICD-10-CM

## 2022-12-27 RX ORDER — ALBUTEROL SULFATE 90 UG/1
AEROSOL, METERED RESPIRATORY (INHALATION)
Qty: 18 G | Refills: 1 | Status: SHIPPED | OUTPATIENT
Start: 2022-12-27

## 2023-01-07 ENCOUNTER — OFFICE VISIT (OUTPATIENT)
Dept: URGENT CARE | Facility: MEDICAL CENTER | Age: 53
End: 2023-01-07

## 2023-01-07 VITALS
OXYGEN SATURATION: 95 % | DIASTOLIC BLOOD PRESSURE: 70 MMHG | SYSTOLIC BLOOD PRESSURE: 140 MMHG | TEMPERATURE: 98.1 F | RESPIRATION RATE: 20 BRPM | WEIGHT: 172 LBS | HEART RATE: 89 BPM | BODY MASS INDEX: 28.66 KG/M2 | HEIGHT: 65 IN

## 2023-01-07 DIAGNOSIS — B34.9 VIRAL SYNDROME: Primary | ICD-10-CM

## 2023-01-07 DIAGNOSIS — J02.9 SORE THROAT: ICD-10-CM

## 2023-01-07 LAB — S PYO AG THROAT QL: NEGATIVE

## 2023-01-07 RX ORDER — FLUTICASONE PROPIONATE 50 MCG
2 SPRAY, SUSPENSION (ML) NASAL DAILY
Qty: 16 G | Refills: 0 | Status: SHIPPED | OUTPATIENT
Start: 2023-01-07

## 2023-01-07 RX ORDER — BENZONATATE 200 MG/1
200 CAPSULE ORAL 3 TIMES DAILY PRN
Qty: 20 CAPSULE | Refills: 0 | Status: SHIPPED | OUTPATIENT
Start: 2023-01-07

## 2023-01-07 NOTE — PROGRESS NOTES
[facility]        NAME: Maulik Neal is a 46 y o  female  : 1970    MRN: [id number]  DATE: 2023  TIME: 12:34 PM    Assessment and Plan   Viral syndrome [B34 9]  1  Viral syndrome  fluticasone (FLONASE) 50 mcg/act nasal spray    benzonatate (TESSALON) 200 MG capsule      2  Sore throat  POCT rapid strepA    Throat culture            Patient Instructions       Follow up with PCP as needed  Increase fluids  Patient requested antibiotics and I explained to her there is no sign of a bacterial infection and if anything grows out on her culture then we will call something in for her  This appears viral and antibiotics do nothing for viruses  Chief Complaint     Chief Complaint   Patient presents with   • Sore Throat     Patient states she has had a sore throat for three days  She noticed white spots on her tonsils  She has body aches         History of Present Illness       Patient with 3-day history of sore throat  Sore Throat   This is a new problem  The problem has been unchanged  There has been no fever  The pain is moderate  Associated symptoms include congestion, coughing, ear pain, a hoarse voice and a plugged ear sensation  Pertinent negatives include no abdominal pain, diarrhea, drooling, ear discharge, headaches, neck pain, shortness of breath, stridor, swollen glands, trouble swallowing or vomiting  She has had no exposure to strep or mono  Review of Systems   Review of Systems   HENT: Positive for congestion, ear pain, hoarse voice and sore throat  Negative for drooling, ear discharge and trouble swallowing  Respiratory: Positive for cough  Negative for shortness of breath and stridor  Gastrointestinal: Negative for abdominal pain, diarrhea and vomiting  Musculoskeletal: Negative for neck pain  Neurological: Negative for headaches  All other systems reviewed and are negative          Current Medications       Current Outpatient Medications:   • albuterol (PROVENTIL HFA,VENTOLIN HFA) 90 mcg/act inhaler, INHALE 2 PUFFS BY MOUTH EVERY 6 HOURS AS NEEDED FOR WHEEZING, Disp: 18 g, Rfl: 1  •  benzonatate (TESSALON) 200 MG capsule, Take 1 capsule (200 mg total) by mouth 3 (three) times a day as needed for cough, Disp: 20 capsule, Rfl: 0  •  fluticasone (FLONASE) 50 mcg/act nasal spray, 2 sprays into each nostril daily, Disp: 16 g, Rfl: 0  •  omeprazole (PriLOSEC) 40 MG capsule, Take 1 capsule (40 mg total) by mouth daily, Disp: 90 capsule, Rfl: 0  •  albuterol (PROVENTIL HFA,VENTOLIN HFA) 90 mcg/act inhaler, INHALE 1 PUFF BY MOUTH EVERY 4 HOURS AS NEEDED FOR WHEEZING (Patient not taking: Reported on 1/7/2023), Disp: 18 g, Rfl: 0  •  albuterol (PROVENTIL HFA,VENTOLIN HFA) 90 mcg/act inhaler, Inhale 1 puff every 6 (six) hours as needed for wheezing, Disp: 18 g, Rfl: 0  •  famotidine (PEPCID) 40 MG tablet, Take 1 tablet (40 mg total) by mouth daily, Disp: 30 tablet, Rfl: 0  •  hydrOXYzine HCL (ATARAX) 50 mg tablet, Take 1 tablet (50 mg total) by mouth 3 (three) times a day as needed for itching, Disp: 30 tablet, Rfl: 0  •  Lidocaine Viscous HCl (XYLOCAINE) 2 % mucosal solution, Swish and spit 15 mL 4 (four) times a day as needed for mouth pain or discomfort, Disp: 100 mL, Rfl: 0    Current Allergies     Allergies as of 01/07/2023 - Reviewed 01/07/2023   Allergen Reaction Noted   • Z-arabella [azithromycin] Rash 10/17/2022   • Other  12/14/2017   • Penicillin g  11/04/2017   • Penicillins  12/14/2017            The following portions of the patient's history were reviewed and updated as appropriate: allergies, current medications, past family history, past medical history, past social history, past surgical history and problem list      Past Medical History:   Diagnosis Date   • Anxiety    • Asthma    • Gastritis        Past Surgical History:   Procedure Laterality Date   • ENDOMETRIAL ABLATION W/ NOVASURE         Family History   Problem Relation Age of Onset   • Cancer Mother    • No Known Problems Father          Medications have been verified  Objective   /70   Pulse 89   Temp 98 1 °F (36 7 °C)   Resp 20   Ht 5' 5" (1 651 m)   Wt 78 kg (172 lb)   LMP  (LMP Unknown)   SpO2 95%   BMI 28 62 kg/m²   No LMP recorded (lmp unknown)  Patient is postmenopausal        Physical Exam     Physical Exam  Vitals and nursing note reviewed  Constitutional:       Appearance: Normal appearance  She is normal weight  HENT:      Right Ear: Ear canal and external ear normal       Left Ear: Ear canal and external ear normal       Nose: Congestion and rhinorrhea present  Mouth/Throat:      Mouth: Mucous membranes are moist       Pharynx: Oropharyngeal exudate and posterior oropharyngeal erythema present  Eyes:      Conjunctiva/sclera: Conjunctivae normal    Cardiovascular:      Rate and Rhythm: Normal rate and regular rhythm  Pulses: Normal pulses  Heart sounds: Normal heart sounds  Pulmonary:      Effort: Pulmonary effort is normal       Breath sounds: Normal breath sounds  Lymphadenopathy:      Cervical: No cervical adenopathy  Neurological:      Mental Status: She is alert  Psychiatric:         Mood and Affect: Mood normal          Behavior: Behavior normal          Nasal mucosa boggy  TMs bulging

## 2023-01-08 LAB — BACTERIA THROAT CULT: NORMAL

## 2023-01-09 ENCOUNTER — OFFICE VISIT (OUTPATIENT)
Dept: FAMILY MEDICINE CLINIC | Facility: CLINIC | Age: 53
End: 2023-01-09

## 2023-01-09 VITALS
HEART RATE: 84 BPM | WEIGHT: 181.6 LBS | TEMPERATURE: 97.5 F | DIASTOLIC BLOOD PRESSURE: 68 MMHG | BODY MASS INDEX: 30.26 KG/M2 | HEIGHT: 65 IN | SYSTOLIC BLOOD PRESSURE: 122 MMHG | RESPIRATION RATE: 18 BRPM | OXYGEN SATURATION: 98 %

## 2023-01-09 DIAGNOSIS — B96.89 BACTERIAL SINUSITIS: Primary | ICD-10-CM

## 2023-01-09 DIAGNOSIS — J32.9 BACTERIAL SINUSITIS: Primary | ICD-10-CM

## 2023-01-09 LAB — BACTERIA THROAT CULT: NORMAL

## 2023-01-09 RX ORDER — AMOXICILLIN AND CLAVULANATE POTASSIUM 875; 125 MG/1; MG/1
1 TABLET, FILM COATED ORAL EVERY 12 HOURS SCHEDULED
Qty: 14 TABLET | Refills: 0 | Status: SHIPPED | OUTPATIENT
Start: 2023-01-09 | End: 2023-01-16

## 2023-01-09 NOTE — ASSESSMENT & PLAN NOTE
Worsening congestion, ear pressure, and sinus pain x1 week accompanied by headache and body aches  - Augmentin BID x7 days  Pt has listed allergy to penicillin but tolerated Augmentin 3 months ago

## 2023-01-09 NOTE — PROGRESS NOTES
Name: Jennifer Valencia      : 1970      MRN: [id number]  Encounter Provider: TERESA Aranda  Encounter Date: 2023   Encounter department: [address] [place] [address]  Bacterial sinusitis  Assessment & Plan:  Worsening congestion, ear pressure, and sinus pain x1 week accompanied by headache and body aches  - Augmentin BID x7 days  Pt has listed allergy to penicillin but tolerated Augmentin 3 months ago  Orders:  -     amoxicillin-clavulanate (Augmentin) 875-125 mg per tablet; Take 1 tablet by mouth every 12 (twelve) hours for 7 days         Subjective     HPI     True Yuval presented to the office for worsening congestion, ear pressure, and sinus pain x1 week  Pt was seen at urgent care [facility] and tested negative for strep  Positive for sore throat, cough, body aches, and headache  Negative for fever, SOB, wheezing, abdominal pain, N/V/D, or rash  Motrin helps a little with aching in legs and shoulders  Using Flonase  Eating and drinking normally  Review of Systems   Constitutional: Positive for fatigue  Negative for appetite change, chills, fever and unexpected weight change  HENT: Positive for congestion, ear pain, rhinorrhea, sinus pressure, sinus pain and sore throat  Negative for ear discharge and trouble swallowing  Eyes: Negative for pain, discharge, redness and itching  Respiratory: Positive for cough  Negative for chest tightness, shortness of breath and wheezing  Cardiovascular: Negative for chest pain, palpitations and leg swelling  Gastrointestinal: Negative for abdominal pain, diarrhea, nausea and vomiting  Genitourinary: Negative for dysuria  Musculoskeletal: Positive for myalgias  Skin: Negative for rash  Neurological: Positive for headaches  Negative for dizziness  All other systems reviewed and are negative        Past Medical History:   Diagnosis Date   • Anxiety    • Asthma    • Gastritis      Past Surgical History:   Procedure Laterality Date   • ENDOMETRIAL ABLATION W/ RAISSA       Family History   Problem Relation Age of Onset   • Cancer Mother    • No Known Problems Father      Social History     Socioeconomic History   • Marital status: Single     Spouse name: None   • Number of children: None   • Years of education: None   • Highest education level: None   Occupational History   • None   Tobacco Use   • Smoking status: Every Day     Packs/day: 1 00     Types: Cigarettes   • Smokeless tobacco: Never   Vaping Use   • Vaping Use: Never used   Substance and Sexual Activity   • Alcohol use: Yes   • Drug use: No   • Sexual activity: None   Other Topics Concern   • None   Social History Narrative   • None     Social Determinants of Health     Financial Resource Strain: Low Risk    • Difficulty of Paying Living Expenses: Not hard at all   Food Insecurity: No Food Insecurity   • Worried About Running Out of Food in the Last Year: Never true   • Ran Out of Food in the Last Year: Never true   Transportation Needs: No Transportation Needs   • Lack of Transportation (Medical): No   • Lack of Transportation (Non-Medical):  No   Physical Activity: Not on file   Stress: Not on file   Social Connections: Not on file   Intimate Partner Violence: Not on file   Housing Stability: Not on file     Current Outpatient Medications on File Prior to Visit   Medication Sig   • albuterol (PROVENTIL HFA,VENTOLIN HFA) 90 mcg/act inhaler INHALE 1 PUFF BY MOUTH EVERY 4 HOURS AS NEEDED FOR WHEEZING (Patient not taking: Reported on 1/7/2023)   • albuterol (PROVENTIL HFA,VENTOLIN HFA) 90 mcg/act inhaler Inhale 1 puff every 6 (six) hours as needed for wheezing   • albuterol (PROVENTIL HFA,VENTOLIN HFA) 90 mcg/act inhaler INHALE 2 PUFFS BY MOUTH EVERY 6 HOURS AS NEEDED FOR WHEEZING   • benzonatate (TESSALON) 200 MG capsule Take 1 capsule (200 mg total) by mouth 3 (three) times a day as needed for cough   • famotidine (PEPCID) 40 MG tablet Take 1 tablet (40 mg total) by mouth daily   • fluticasone (FLONASE) 50 mcg/act nasal spray 2 sprays into each nostril daily   • hydrOXYzine HCL (ATARAX) 50 mg tablet Take 1 tablet (50 mg total) by mouth 3 (three) times a day as needed for itching   • Lidocaine Viscous HCl (XYLOCAINE) 2 % mucosal solution Swish and spit 15 mL 4 (four) times a day as needed for mouth pain or discomfort   • omeprazole (PriLOSEC) 40 MG capsule Take 1 capsule (40 mg total) by mouth daily     Allergies   Allergen Reactions   • Z-Galdino [Azithromycin] Rash   • Other      Shellfish      • Penicillin G      Immunization History   Administered Date(s) Administered   • Tuberculin Skin Test-PPD Intradermal 02/08/2019, 02/25/2019       Objective     /68 (BP Location: Left arm, Patient Position: Sitting, Cuff Size: Standard)   Pulse 84   Temp 97 5 °F (36 4 °C) (Temporal)   Resp 18   Ht 5' 5" (1 651 m)   Wt 82 4 kg (181 lb 9 6 oz)   LMP  (LMP Unknown)   SpO2 98%   BMI 30 22 kg/m²     Physical Exam  Vitals reviewed  Constitutional:       General: She is not in acute distress  Appearance: She is obese  She is not ill-appearing or diaphoretic  HENT:      Head: Normocephalic and atraumatic  Right Ear: Ear canal and external ear normal  Tympanic membrane is injected  Left Ear: Tympanic membrane, ear canal and external ear normal       Nose: Rhinorrhea present  Rhinorrhea is clear  Right Turbinates: Swollen  Left Turbinates: Swollen  Right Sinus: Maxillary sinus tenderness present  No frontal sinus tenderness  Left Sinus: Maxillary sinus tenderness present  No frontal sinus tenderness  Mouth/Throat:      Mouth: Mucous membranes are moist       Pharynx: Posterior oropharyngeal erythema present  Tonsils: No tonsillar exudate  1+ on the right  1+ on the left  Eyes:      General: Lids are normal       Conjunctiva/sclera: Conjunctivae normal       Pupils: Pupils are equal, round, and reactive to light  Neck:      Thyroid: No thyromegaly or thyroid tenderness  Cardiovascular:      Rate and Rhythm: Normal rate and regular rhythm  Heart sounds: Normal heart sounds  No murmur heard  Pulmonary:      Effort: Pulmonary effort is normal  No tachypnea  Breath sounds: Normal breath sounds  No decreased breath sounds, wheezing, rhonchi or rales  Musculoskeletal:      Cervical back: Neck supple  Lymphadenopathy:      Cervical: No cervical adenopathy  Skin:     General: Skin is warm and dry  Neurological:      Mental Status: She is alert and oriented to person, place, and time     Psychiatric:         Mood and Affect: Mood and affect normal        TERESA Chang

## 2023-01-10 ENCOUNTER — TELEPHONE (OUTPATIENT)
Dept: FAMILY MEDICINE CLINIC | Facility: CLINIC | Age: 53
End: 2023-01-10

## 2023-01-10 NOTE — LETTER
January 10, 2023     Patient: Sally Platt  YOB: 1970  Date of Visit: 1/9/2023    To Whom it May Concern:    Sally Platt is under my professional care  Julio Seth was seen in my office on 1/9/2023  Julio Seth may return to work on 1/11/2023  If you have any questions or concerns, please don't hesitate to call           Sincerely,        Giovanni Lakhani

## 2023-01-18 DIAGNOSIS — K29.50 CHRONIC GASTRITIS WITHOUT BLEEDING, UNSPECIFIED GASTRITIS TYPE: ICD-10-CM

## 2023-01-18 RX ORDER — OMEPRAZOLE 40 MG/1
40 CAPSULE, DELAYED RELEASE ORAL DAILY
Qty: 90 CAPSULE | Refills: 0 | Status: SHIPPED | OUTPATIENT
Start: 2023-01-18

## 2023-01-25 DIAGNOSIS — J40 BRONCHITIS: ICD-10-CM

## 2023-01-25 DIAGNOSIS — J02.9 SORE THROAT: ICD-10-CM

## 2023-01-25 RX ORDER — ALBUTEROL SULFATE 90 UG/1
2 AEROSOL, METERED RESPIRATORY (INHALATION) EVERY 6 HOURS PRN
Qty: 18 G | Refills: 0 | Status: SHIPPED | OUTPATIENT
Start: 2023-01-25

## 2023-02-19 DIAGNOSIS — J02.9 SORE THROAT: ICD-10-CM

## 2023-02-19 DIAGNOSIS — J40 BRONCHITIS: ICD-10-CM

## 2023-02-20 RX ORDER — ALBUTEROL SULFATE 90 UG/1
AEROSOL, METERED RESPIRATORY (INHALATION)
Qty: 18 G | Refills: 0 | Status: SHIPPED | OUTPATIENT
Start: 2023-02-20

## 2023-03-10 PROBLEM — B96.89 BACTERIAL SINUSITIS: Status: RESOLVED | Noted: 2023-01-09 | Resolved: 2023-03-10

## 2023-03-10 PROBLEM — J32.9 BACTERIAL SINUSITIS: Status: RESOLVED | Noted: 2023-01-09 | Resolved: 2023-03-10

## 2023-03-21 DIAGNOSIS — J45.20 MILD INTERMITTENT ASTHMA WITHOUT COMPLICATION: Primary | ICD-10-CM

## 2023-03-21 DIAGNOSIS — J40 BRONCHITIS: ICD-10-CM

## 2023-03-21 DIAGNOSIS — J02.9 SORE THROAT: ICD-10-CM

## 2023-03-22 RX ORDER — ALBUTEROL SULFATE 90 UG/1
AEROSOL, METERED RESPIRATORY (INHALATION)
Qty: 54 G | OUTPATIENT
Start: 2023-03-22

## 2023-04-21 DIAGNOSIS — J45.20 MILD INTERMITTENT ASTHMA WITHOUT COMPLICATION: ICD-10-CM

## 2023-04-23 RX ORDER — ALBUTEROL SULFATE 90 UG/1
2 AEROSOL, METERED RESPIRATORY (INHALATION) EVERY 6 HOURS PRN
Qty: 18 G | Refills: 0 | Status: SHIPPED | OUTPATIENT
Start: 2023-04-23 | End: 2023-05-01 | Stop reason: SDUPTHER

## 2023-04-29 ENCOUNTER — NURSE TRIAGE (OUTPATIENT)
Dept: OTHER | Facility: OTHER | Age: 53
End: 2023-04-29

## 2023-04-29 NOTE — TELEPHONE ENCOUNTER
Regarding: White spots in throat and sore throat  ----- Message from Jocelyn Bailey sent at 4/29/2023  4:58 PM EDT -----  Patient calling back; was not aware it would take a while for a nurse to call back as she was told the nurse would be calling her back in a few minutes  ----- Message from Emmett White sent at 4/29/2023  3:30 PM EDT -----  I may have a throat infection  I checked in my throat and see white spots  My throat has been hurting for several days and thought it was from my allergies

## 2023-04-29 NOTE — TELEPHONE ENCOUNTER
Patient given information on how to manage symptoms at home   Information given on urgent care and ED at this time, patient refuses       Appt made for patient to see Kurt Kemp on Monday May 1st at 3:40

## 2023-05-01 ENCOUNTER — OFFICE VISIT (OUTPATIENT)
Dept: FAMILY MEDICINE CLINIC | Facility: CLINIC | Age: 53
End: 2023-05-01

## 2023-05-01 VITALS
WEIGHT: 172.6 LBS | TEMPERATURE: 97.2 F | SYSTOLIC BLOOD PRESSURE: 132 MMHG | BODY MASS INDEX: 28.76 KG/M2 | HEART RATE: 107 BPM | RESPIRATION RATE: 18 BRPM | HEIGHT: 65 IN | OXYGEN SATURATION: 97 % | DIASTOLIC BLOOD PRESSURE: 72 MMHG

## 2023-05-01 DIAGNOSIS — J30.9 ALLERGIC RHINITIS, UNSPECIFIED SEASONALITY, UNSPECIFIED TRIGGER: ICD-10-CM

## 2023-05-01 DIAGNOSIS — J45.20 MILD INTERMITTENT ASTHMA WITHOUT COMPLICATION: ICD-10-CM

## 2023-05-01 DIAGNOSIS — J02.9 SORE THROAT: Primary | ICD-10-CM

## 2023-05-01 RX ORDER — ALBUTEROL SULFATE 90 UG/1
2 AEROSOL, METERED RESPIRATORY (INHALATION) EVERY 6 HOURS PRN
Qty: 18 G | Refills: 0 | Status: SHIPPED | OUTPATIENT
Start: 2023-05-01

## 2023-05-01 RX ORDER — FLUTICASONE PROPIONATE 50 MCG
2 SPRAY, SUSPENSION (ML) NASAL 2 TIMES DAILY
Qty: 16 G | Refills: 2 | Status: SHIPPED | OUTPATIENT
Start: 2023-05-01

## 2023-05-01 RX ORDER — AMOXICILLIN 500 MG/1
500 CAPSULE ORAL EVERY 8 HOURS SCHEDULED
Qty: 30 CAPSULE | Refills: 0 | Status: SHIPPED | OUTPATIENT
Start: 2023-05-01 | End: 2023-05-11

## 2023-05-01 RX ORDER — MONTELUKAST SODIUM 10 MG/1
10 TABLET ORAL
Qty: 90 TABLET | Refills: 0 | Status: SHIPPED | OUTPATIENT
Start: 2023-05-01

## 2023-05-01 NOTE — PROGRESS NOTES
Name: Gavin Douglas      : 1970      MRN: [id number]  Encounter Provider: TERESA Craig  Encounter Date: 2023   Encounter department: [facility]     1  Sore throat  -     Throat culture    2  Allergic rhinitis, unspecified seasonality, unspecified trigger  -     fluticasone (FLONASE) 50 mcg/act nasal spray; 2 sprays into each nostril 2 (two) times a day    3  Mild intermittent asthma without complication  -     montelukast (SINGULAIR) 10 mg tablet; Take 1 tablet (10 mg total) by mouth daily at bedtime  -     albuterol (PROVENTIL HFA,VENTOLIN HFA) 90 mcg/act inhaler; Inhale 2 puffs every 6 (six) hours as needed for wheezing    supportive measures discussed, recommend salt water gargles, hot tea with honey, sinus lavage  BMI Counseling: Body mass index is 28 72 kg/m²  The BMI is above normal  Nutrition recommendations include consuming healthier snacks  Rationale for BMI follow-up plan is due to patient being overweight or obese  Nohemi Chung is a 45 yo female with past medical history of asthma and allergies who presents today for sore throat  Patient started about one week ago with a sore throat and congestion  She is also experiencing trouble swallowing and fatigue  She looked in the mirror at her pharynx and saw white patches so she called for an appointment  She has tried Zyrtec D without improvement  Review of Systems   Constitutional: Positive for fatigue  Negative for chills and fever  HENT: Positive for sinus pressure, sinus pain, sore throat and trouble swallowing  Negative for ear pain  Eyes: Negative for pain and visual disturbance  Respiratory: Negative for cough and shortness of breath  Cardiovascular: Negative for chest pain and palpitations  Gastrointestinal: Negative for abdominal pain and vomiting  Genitourinary: Negative for dysuria and hematuria     Musculoskeletal: Negative for arthralgias and back pain  Skin: Negative for color change and rash  Neurological: Negative for seizures and syncope  All other systems reviewed and are negative  Past Medical History:   Diagnosis Date    Anxiety     Asthma     Gastritis      Past Surgical History:   Procedure Laterality Date    ENDOMETRIAL ABLATION W/ Israel Middleton       Family History   Problem Relation Age of Onset   [facility] Cancer Mother     No Known Problems Father      Social History     Socioeconomic History    Marital status: Single     Spouse name: None    Number of children: None    Years of education: None    Highest education level: None   Occupational History    None   Tobacco Use    Smoking status: Every Day     Packs/day: 1 00     Types: Cigarettes    Smokeless tobacco: Never   Vaping Use    Vaping Use: Never used   Substance and Sexual Activity    Alcohol use:  Yes    Drug use: No    Sexual activity: None   Other Topics Concern    None   Social History Narrative    None     Social Determinants of Health     Financial Resource Strain: Not on file   Food Insecurity: Not on file   Transportation Needs: Not on file   Physical Activity: Not on file   Stress: Not on file   Social Connections: Not on file   Intimate Partner Violence: Not on file   Housing Stability: Not on file     Current Outpatient Medications on File Prior to Visit   Medication Sig    benzonatate (TESSALON) 200 MG capsule Take 1 capsule (200 mg total) by mouth 3 (three) times a day as needed for cough    hydrOXYzine HCL (ATARAX) 50 mg tablet Take 1 tablet (50 mg total) by mouth 3 (three) times a day as needed for itching    Lidocaine Viscous HCl (XYLOCAINE) 2 % mucosal solution Swish and spit 15 mL 4 (four) times a day as needed for mouth pain or discomfort    omeprazole (PriLOSEC) 40 MG capsule Take 1 capsule (40 mg total) by mouth daily    [DISCONTINUED] albuterol (PROVENTIL HFA,VENTOLIN HFA) 90 mcg/act inhaler Inhale 2 puffs every 6 (six) hours "as needed for wheezing    [DISCONTINUED] famotidine (PEPCID) 40 MG tablet Take 1 tablet (40 mg total) by mouth daily    [DISCONTINUED] fluticasone (FLONASE) 50 mcg/act nasal spray 2 sprays into each nostril daily     Allergies   Allergen Reactions    Z-Galdino [Azithromycin] Rash    Other      Shellfish       Penicillin G      Immunization History   Administered Date(s) Administered    Tuberculin Skin Test-PPD Intradermal 02/08/2019, 02/25/2019       Objective     /72 (BP Location: Left arm, Patient Position: Sitting, Cuff Size: Standard)   Pulse (!) 107   Temp (!) 97 2 °F (36 2 °C) (Temporal)   Resp 18   Ht 5' 5\" (1 651 m)   Wt 78 3 kg (172 lb 9 6 oz)   LMP  (LMP Unknown)   SpO2 97%   BMI 28 72 kg/m²     Physical Exam  Vitals and nursing note reviewed  Constitutional:       General: She is not in acute distress  Appearance: She is well-developed  She is not diaphoretic  HENT:      Head: Normocephalic and atraumatic  Right Ear: External ear normal       Left Ear: External ear normal       Mouth/Throat:      Pharynx: Oropharyngeal exudate and posterior oropharyngeal erythema present  Eyes:      Conjunctiva/sclera: Conjunctivae normal       Pupils: Pupils are equal, round, and reactive to light  Cardiovascular:      Rate and Rhythm: Normal rate and regular rhythm  Pulmonary:      Effort: Pulmonary effort is normal  No respiratory distress  Breath sounds: Normal breath sounds  No wheezing  Abdominal:      General: Bowel sounds are normal  There is no distension  Palpations: Abdomen is soft  Tenderness: There is no abdominal tenderness  There is no guarding or rebound  Musculoskeletal:         General: No deformity  Normal range of motion  Cervical back: Normal range of motion and neck supple  Lymphadenopathy:      Cervical: Cervical adenopathy present  Skin:     General: Skin is warm and dry        Capillary Refill: Capillary refill takes less than 2 " seconds  Findings: No rash  Neurological:      Mental Status: She is alert and oriented to person, place, and time  Sensory: No sensory deficit        Coordination: Coordination normal       Deep Tendon Reflexes: Reflexes normal    Psychiatric:         Behavior: Behavior normal        Tomeka Tomlinson

## 2023-05-01 NOTE — LETTER
May 1, 2023     Patient: Gabby Sharp   YOB: 1970   Date of Visit: 5/1/2023       To Whom it May Concern:    Gabby Sharp was seen in my clinic on 5/1/2023  She is excused from work 4/27/2023, 4/28/2023, 5/1/2023, and 5/2/2023  If you have any questions or concerns, please don't hesitate to call           Sincerely,          TERESA Quintero        CC: No Recipients

## 2023-05-03 LAB — BACTERIA THROAT CULT: NORMAL

## 2023-05-10 NOTE — ASSESSMENT & PLAN NOTE
Has been experiencing neurological symptoms in lower extremities  Currently following up with physical therapy, Ortho, and has appointment with Neurosurgery  Colchicine Counseling:  Patient counseled regarding adverse effects including but not limited to stomach upset (nausea, vomiting, stomach pain, or diarrhea).  Patient instructed to limit alcohol consumption while taking this medication.  Colchicine may reduce blood counts especially with prolonged use.  The patient understands that monitoring of kidney function and blood counts may be required, especially at baseline. The patient verbalized understanding of the proper use and possible adverse effects of colchicine.  All of the patient's questions and concerns were addressed.

## 2023-05-24 DIAGNOSIS — J45.20 MILD INTERMITTENT ASTHMA WITHOUT COMPLICATION: ICD-10-CM

## 2023-05-26 RX ORDER — ALBUTEROL SULFATE 90 UG/1
2 AEROSOL, METERED RESPIRATORY (INHALATION) EVERY 6 HOURS PRN
Qty: 18 G | Refills: 0 | Status: SHIPPED | OUTPATIENT
Start: 2023-05-26

## 2023-06-19 DIAGNOSIS — J45.20 MILD INTERMITTENT ASTHMA WITHOUT COMPLICATION: ICD-10-CM

## 2023-06-19 RX ORDER — ALBUTEROL SULFATE 90 UG/1
AEROSOL, METERED RESPIRATORY (INHALATION)
Qty: 18 G | Refills: 0 | Status: SHIPPED | OUTPATIENT
Start: 2023-06-19 | End: 2023-06-22 | Stop reason: SDUPTHER

## 2023-06-22 ENCOUNTER — OFFICE VISIT (OUTPATIENT)
Dept: FAMILY MEDICINE CLINIC | Facility: CLINIC | Age: 53
End: 2023-06-22

## 2023-06-22 VITALS
WEIGHT: 165 LBS | RESPIRATION RATE: 16 BRPM | TEMPERATURE: 97.1 F | HEART RATE: 105 BPM | HEIGHT: 65 IN | DIASTOLIC BLOOD PRESSURE: 82 MMHG | BODY MASS INDEX: 27.49 KG/M2 | OXYGEN SATURATION: 96 % | SYSTOLIC BLOOD PRESSURE: 144 MMHG

## 2023-06-22 DIAGNOSIS — J30.9 ALLERGIC RHINITIS, UNSPECIFIED SEASONALITY, UNSPECIFIED TRIGGER: Primary | ICD-10-CM

## 2023-06-22 DIAGNOSIS — Z76.0 MEDICATION REFILL: ICD-10-CM

## 2023-06-22 PROCEDURE — 99212 OFFICE O/P EST SF 10 MIN: CPT

## 2023-06-22 RX ORDER — CETIRIZINE HYDROCHLORIDE 10 MG/1
10 TABLET ORAL DAILY
Qty: 90 TABLET | Refills: 0 | Status: SHIPPED | OUTPATIENT
Start: 2023-06-22 | End: 2023-09-20

## 2023-06-22 RX ORDER — GUAIFENESIN 600 MG/1
1200 TABLET, EXTENDED RELEASE ORAL EVERY 12 HOURS SCHEDULED
Qty: 120 TABLET | Refills: 0 | Status: SHIPPED | OUTPATIENT
Start: 2023-06-22 | End: 2023-07-22

## 2023-06-22 RX ORDER — ALBUTEROL SULFATE 90 UG/1
2 AEROSOL, METERED RESPIRATORY (INHALATION) EVERY 6 HOURS PRN
Qty: 96 G | Refills: 5 | Status: SHIPPED | OUTPATIENT
Start: 2023-06-22

## 2023-06-22 NOTE — ASSESSMENT & PLAN NOTE
Patient reports hx of chronic allergies, due to lack of sign/symptoms of bacterial infection swelling may be related to allergies  Through shared decision making will change allergy medication to zyrtec 10 mg daily, continue with Flonase and Singulair; start mucinex bid   Follow up if symptoms worsen or do not improve

## 2023-06-22 NOTE — PROGRESS NOTES
Name: Gabi Delgado      : 1970      MRN: [id number]  Encounter Provider: TERESA Wing  Encounter Date: 2023   Encounter department: [address] [place] [address]     1  Allergic rhinitis, unspecified seasonality, unspecified trigger  Assessment & Plan:  Patient reports hx of chronic allergies, due to lack of sign/symptoms of bacterial infection swelling may be related to allergies  Through shared decision making will change allergy medication to zyrtec 10 mg daily, continue with Flonase and Singulair; start mucinex bid   Follow up if symptoms worsen or do not improve  Orders:  -     guaiFENesin (MUCINEX) 600 mg 12 hr tablet; Take 2 tablets (1,200 mg total) by mouth every 12 (twelve) hours  -     cetirizine (ZyrTEC) 10 mg tablet; Take 1 tablet (10 mg total) by mouth daily    2  Medication refill  -     albuterol (PROVENTIL HFA,VENTOLIN HFA) 90 mcg/act inhaler; Inhale 2 puffs every 6 (six) hours as needed for wheezing         Subjective      Toshia [address] 46 y o  female  has a past medical history of Anxiety, Asthma, and Gastritis  Presenting today for intermittent swelling around left ear; denies ear pain, ear drainage, dental caries  Patient well appearing no other complaints Denies fever, chillsfatigue, headaches, dizziness, blurred vision, nausea, palpitation, chest pain, SOB, urinary changes, weakness, bowel changes, sleep problems,  sick contacts, red flag signs,  or recent travel    Overall patient reports feeling well   Patient has no further complaints other than what is mentioned in the ROS  Earache   There is pain in the left ear  This is a new problem  The current episode started in the past 7 days  The problem has been waxing and waning  There has been no fever  The pain is at a severity of 2/10  The pain is mild  Pertinent negatives include no abdominal pain, coughing, headaches, rash, sore throat or vomiting   She has tried "nothing for the symptoms  The treatment provided no relief  Review of Systems   Constitutional: Negative for chills and fever  HENT: Negative for ear pain and sore throat  Eyes: Negative for pain and visual disturbance  Respiratory: Negative for cough and shortness of breath  Cardiovascular: Negative for chest pain and palpitations  Gastrointestinal: Negative for abdominal pain and vomiting  Genitourinary: Negative for dysuria and hematuria  Musculoskeletal: Negative for arthralgias and back pain  Skin: Negative for color change and rash  Neurological: Negative for seizures, syncope and headaches  All other systems reviewed and are negative  Current Outpatient Medications on File Prior to Visit   Medication Sig   • fluticasone (FLONASE) 50 mcg/act nasal spray 2 sprays into each nostril 2 (two) times a day   • hydrOXYzine HCL (ATARAX) 50 mg tablet Take 1 tablet (50 mg total) by mouth 3 (three) times a day as needed for itching   • Lidocaine Viscous HCl (XYLOCAINE) 2 % mucosal solution Swish and spit 15 mL 4 (four) times a day as needed for mouth pain or discomfort   • montelukast (SINGULAIR) 10 mg tablet Take 1 tablet (10 mg total) by mouth daily at bedtime   • omeprazole (PriLOSEC) 40 MG capsule Take 1 capsule (40 mg total) by mouth daily   • [DISCONTINUED] albuterol (PROVENTIL HFA,VENTOLIN HFA) 90 mcg/act inhaler INHALE 2 PUFFS BY MOUTH EVERY 6 HOURS AS NEEDED FOR WHEEZING   • [DISCONTINUED] benzonatate (TESSALON) 200 MG capsule Take 1 capsule (200 mg total) by mouth 3 (three) times a day as needed for cough       Objective     /82 (BP Location: Right arm, Patient Position: Sitting, Cuff Size: Standard)   Pulse 105   Temp (!) 97 1 °F (36 2 °C) (Temporal)   Resp 16   Ht 5' 5\" (1 651 m)   Wt 74 8 kg (165 lb)   LMP  (LMP Unknown)   SpO2 96%   BMI 27 46 kg/m²     Physical Exam  Vitals and nursing note reviewed  Constitutional:       General: She is not in acute distress     " Appearance: Normal appearance  She is not ill-appearing  HENT:      Head: Normocephalic and atraumatic  Right Ear: Tympanic membrane, ear canal and external ear normal       Left Ear: Tympanic membrane, ear canal and external ear normal       Nose: Nose normal       Mouth/Throat:      Mouth: Mucous membranes are moist    Eyes:      General:         Right eye: No discharge  Left eye: No discharge  Pupils: Pupils are equal, round, and reactive to light  Cardiovascular:      Rate and Rhythm: Normal rate and regular rhythm  Pulses: Normal pulses  Heart sounds: Normal heart sounds  Pulmonary:      Effort: Pulmonary effort is normal  No respiratory distress  Breath sounds: Normal breath sounds  No wheezing  Abdominal:      General: Bowel sounds are normal       Palpations: Abdomen is soft  Tenderness: There is no abdominal tenderness  There is no right CVA tenderness or left CVA tenderness  Musculoskeletal:         General: Normal range of motion  Cervical back: Normal range of motion  Skin:     General: Skin is warm and dry  Neurological:      General: No focal deficit present  Mental Status: She is alert and oriented to person, place, and time         Marine DiversTERESA

## 2023-07-02 DIAGNOSIS — K29.50 CHRONIC GASTRITIS WITHOUT BLEEDING, UNSPECIFIED GASTRITIS TYPE: ICD-10-CM

## 2023-07-03 RX ORDER — OMEPRAZOLE 40 MG/1
40 CAPSULE, DELAYED RELEASE ORAL DAILY
Qty: 90 CAPSULE | Refills: 0 | Status: SHIPPED | OUTPATIENT
Start: 2023-07-03

## 2023-07-28 DIAGNOSIS — J45.20 MILD INTERMITTENT ASTHMA WITHOUT COMPLICATION: ICD-10-CM

## 2023-07-28 RX ORDER — MONTELUKAST SODIUM 10 MG/1
10 TABLET ORAL
Qty: 90 TABLET | Refills: 0 | Status: SHIPPED | OUTPATIENT
Start: 2023-07-28

## 2023-08-17 ENCOUNTER — HOSPITAL ENCOUNTER (EMERGENCY)
Facility: HOSPITAL | Age: 53
Discharge: HOME | End: 2023-08-17
Attending: EMERGENCY MEDICINE
Payer: COMMERCIAL

## 2023-08-17 VITALS
RESPIRATION RATE: 18 BRPM | HEART RATE: 113 BPM | OXYGEN SATURATION: 96 % | SYSTOLIC BLOOD PRESSURE: 158 MMHG | DIASTOLIC BLOOD PRESSURE: 88 MMHG | TEMPERATURE: 98.3 F

## 2023-08-17 DIAGNOSIS — R05.9 COUGH: ICD-10-CM

## 2023-08-17 DIAGNOSIS — J32.9 SINUSITIS: ICD-10-CM

## 2023-08-17 DIAGNOSIS — J30.9 ALLERGIC RHINITIS: Primary | ICD-10-CM

## 2023-08-17 LAB — S PYO DNA THROAT QL NAA+PROBE: NOT DETECTED

## 2023-08-17 PROCEDURE — 99283 EMERGENCY DEPT VISIT LOW MDM: CPT

## 2023-08-17 PROCEDURE — 99284 EMERGENCY DEPT VISIT MOD MDM: CPT | Performed by: PHYSICIAN ASSISTANT

## 2023-08-17 PROCEDURE — 87651 STREP A DNA AMP PROBE: CPT | Performed by: PHYSICIAN ASSISTANT

## 2023-08-17 RX ORDER — PREDNISONE 50 MG/1
50 TABLET ORAL DAILY
Qty: 5 TABLET | Refills: 0 | Status: SHIPPED | OUTPATIENT
Start: 2023-08-17 | End: 2023-08-22

## 2023-08-17 RX ORDER — DOXYCYCLINE HYCLATE 100 MG/1
100 CAPSULE ORAL 2 TIMES DAILY
Qty: 14 CAPSULE | Refills: 0 | Status: SHIPPED | OUTPATIENT
Start: 2023-08-17 | End: 2023-08-24

## 2023-08-18 ENCOUNTER — OFFICE VISIT (OUTPATIENT)
Dept: FAMILY MEDICINE CLINIC | Facility: CLINIC | Age: 53
End: 2023-08-18

## 2023-08-18 VITALS
BODY MASS INDEX: 27.16 KG/M2 | WEIGHT: 163 LBS | OXYGEN SATURATION: 96 % | RESPIRATION RATE: 18 BRPM | HEART RATE: 77 BPM | TEMPERATURE: 97.6 F | DIASTOLIC BLOOD PRESSURE: 74 MMHG | HEIGHT: 65 IN | SYSTOLIC BLOOD PRESSURE: 118 MMHG

## 2023-08-18 DIAGNOSIS — R09.81 SINUS CONGESTION: Primary | ICD-10-CM

## 2023-08-18 PROCEDURE — 99214 OFFICE O/P EST MOD 30 MIN: CPT

## 2023-08-18 NOTE — LETTER
August 18, 2023     Patient: Homa Ngo  YOB: 1970  Date of Visit: 8/18/2023      To Whom it May Concern:    Homa Ngo is under my professional care. Agapito Reina was seen in my office on 8/18/2023. Agapito Reina may return to work on 8/21/2023 . If you have any questions or concerns, please don't hesitate to call.          Sincerely,          Ada Gaona, [address] [place] [address]

## 2023-08-18 NOTE — PATIENT INSTRUCTIONS
Look for Afrin Severe Sinus Congestion (or a generic with Oxymetazoline hydrochloride 0.05% active ingredient).

## 2023-08-18 NOTE — PROGRESS NOTES
Name: Felicia Delvalle      : 1970      MRN: [id number]  Encounter Provider: TERESA Potter  Encounter Date: 2023   Encounter department: [facility]     1. Sinus congestion  Assessment & Plan:  Symptoms more suggestive of sinus congestion rather than sinus infection with bilateral symptoms, no fever, and symptoms for 3-4 days only. - Recommend increased fluid intake. - Trial Afrin Severe Congestion for no more than 3 days. Cautioned pt regarding rebound congestion.   - Continue prednisone as prescribed by ED yesterday. - As previously instructed by ED provider, begin doxycycline if no improvement in symptoms. Educated pt regarding the indications for doxycycline.   - Continue albuterol PRN for SOB. Subjective     HPI     Clance Console presents to the office for a SAME DAY appt for upper respiratory symptoms that started 3 days ago. Pt reports congestion, runny nose, sore throat, pressure in ears, sinus pressure, and post nasal drip that is causing her to cough and choke on phlegm, especially at night. She has some SOB but no wheezing. Pt is negative for fever. Pt has been taking Claritin-D and using her albuterol inhaler, took one dose of fexofenadine/pseudoephedrine this morning. Pt was evaluated for the same concern at the ED yesterday and prescribed prednisone x5 days and given a prescription for doxycycline to fill in the event that symptoms worsen or fail to improve. Pt has taken one dose of the prednisone and has not filled the doxycycline, stating, "that antibiotic does not work for me."       Review of Systems   Constitutional: Negative for chills, fatigue, fever and unexpected weight change. HENT: Positive for congestion, ear pain (bilateral pressure), postnasal drip, rhinorrhea, sinus pressure, sinus pain and sore throat. Negative for ear discharge and trouble swallowing.     Eyes: Negative for pain, discharge, redness and itching. Respiratory: Positive for cough and shortness of breath. Negative for chest tightness and wheezing. Cardiovascular: Negative for chest pain, palpitations and leg swelling. Gastrointestinal: Negative for abdominal pain, diarrhea, nausea and vomiting. Allergic/Immunologic: Positive for environmental allergies. Neurological: Negative for dizziness, light-headedness and headaches. All other systems reviewed and are negative. Past Medical History:   Diagnosis Date   • Anxiety    • Asthma    • Gastritis      Past Surgical History:   Procedure Laterality Date   • ENDOMETRIAL ABLATION W/ NOVASURE       Family History   Problem Relation Age of Onset   • Cancer Mother    • No Known Problems Father      Social History     Socioeconomic History   • Marital status: Single     Spouse name: None   • Number of children: None   • Years of education: None   • Highest education level: None   Occupational History   • None   Tobacco Use   • Smoking status: Former     Packs/day: 0.25     Types: Cigarettes     Passive exposure: Past   • Smokeless tobacco: Never   Vaping Use   • Vaping Use: Never used   Substance and Sexual Activity   • Alcohol use: Yes     Comment: socially   • Drug use: No   • Sexual activity: None   Other Topics Concern   • None   Social History Narrative   • None     Social Determinants of Health     Financial Resource Strain: Low Risk  (8/18/2023)    Overall Financial Resource Strain (CARDIA)    • Difficulty of Paying Living Expenses: Not very hard   Food Insecurity: No Food Insecurity (8/18/2023)    Hunger Vital Sign    • Worried About Running Out of Food in the Last Year: Never true    • Ran Out of Food in the Last Year: Never true   Transportation Needs: No Transportation Needs (8/18/2023)    PRAPARE - Transportation    • Lack of Transportation (Medical): No    • Lack of Transportation (Non-Medical):  No   Physical Activity: Not on file   Stress: Not on file   Social Connections: Not on file   Intimate Partner Violence: Not on file   Housing Stability: Not on file     Current Outpatient Medications on File Prior to Visit   Medication Sig   • albuterol (PROVENTIL HFA,VENTOLIN HFA) 90 mcg/act inhaler Inhale 2 puffs every 6 (six) hours as needed for wheezing   • cetirizine (ZyrTEC) 10 mg tablet Take 1 tablet (10 mg total) by mouth daily   • doxycycline hyclate (VIBRAMYCIN) 100 mg capsule Take 1 capsule (100 mg total) by mouth 2 (two) times a day for 7 days   • fluticasone (FLONASE) 50 mcg/act nasal spray 2 sprays into each nostril 2 (two) times a day   • hydrOXYzine HCL (ATARAX) 50 mg tablet Take 1 tablet (50 mg total) by mouth 3 (three) times a day as needed for itching   • Lidocaine Viscous HCl (XYLOCAINE) 2 % mucosal solution Swish and spit 15 mL 4 (four) times a day as needed for mouth pain or discomfort   • montelukast (SINGULAIR) 10 mg tablet TAKE 1 TABLET(10 MG) BY MOUTH DAILY AT BEDTIME   • omeprazole (PriLOSEC) 40 MG capsule Take 1 capsule (40 mg total) by mouth daily   • predniSONE 50 mg tablet Take 1 tablet (50 mg total) by mouth daily for 5 days     Allergies   Allergen Reactions   • Z-Galdino [Azithromycin] Rash   • Other      Shellfish      • Penicillin G      Immunization History   Administered Date(s) Administered   • Tuberculin Skin Test-PPD Intradermal 02/08/2019, 02/25/2019       Objective     /74 (BP Location: Left arm, Patient Position: Sitting, Cuff Size: Standard)   Pulse 77   Temp 97.6 °F (36.4 °C) (Temporal)   Resp 18   Ht 5' 5" (1.651 m)   Wt 73.9 kg (163 lb)   LMP  (LMP Unknown)   SpO2 96%   BMI 27.12 kg/m²     Physical Exam  Vitals reviewed. Constitutional:       General: She is not in acute distress. Appearance: She is overweight. She is not ill-appearing or diaphoretic. HENT:      Head: Normocephalic and atraumatic.       Right Ear: Tympanic membrane, ear canal and external ear normal.      Left Ear: Tympanic membrane, ear canal and external ear normal.      Nose: Rhinorrhea present. Rhinorrhea is clear. Right Turbinates: Swollen. Left Turbinates: Swollen. Right Sinus: Maxillary sinus tenderness present. No frontal sinus tenderness. Left Sinus: Maxillary sinus tenderness present. No frontal sinus tenderness. Mouth/Throat:      Mouth: Mucous membranes are moist.      Pharynx: Posterior oropharyngeal erythema present. Tonsils: No tonsillar exudate. 2+ on the right. 2+ on the left. Eyes:      General: Lids are normal.      Conjunctiva/sclera: Conjunctivae normal.      Pupils: Pupils are equal, round, and reactive to light. Cardiovascular:      Rate and Rhythm: Normal rate and regular rhythm. Heart sounds: Normal heart sounds. No murmur heard. Pulmonary:      Effort: Pulmonary effort is normal. No tachypnea. Breath sounds: Normal breath sounds. No decreased breath sounds, wheezing, rhonchi or rales. Musculoskeletal:      Cervical back: Neck supple. Lymphadenopathy:      Cervical: No cervical adenopathy. Skin:     General: Skin is warm and dry. Neurological:      Mental Status: She is alert and oriented to person, place, and time. Cranial Nerves: No cranial nerve deficit. Psychiatric:         Attention and Perception: Attention normal.         Mood and Affect: Affect normal. Mood is anxious.        TERESA Singh

## 2023-08-18 NOTE — ED PROVIDER NOTES
History  Chief Complaint   Patient presents with   • Cough     Patient came in via EMS from home. Pt reports increased congestion and pressure in nose and sinuses. Pt also has dry cough. Pt reports "I feel better when I am outside of my house." Pt took Claritin and Flonase without relief. Patient is a 51-year-old female past medical history of asthma, seasonal allergies, anxiety and gastritis who presents for evaluation of URI symptoms. Patient states she has very bad seasonal allergies. She states that approximately 3 to 4 days ago she started with nasal congestion, sinus pain and pressure, runny nose, postnasal drip, sore throat and dry cough. Patient states that the postnasal drip is making her have coughing fits. She states that she thought her symptoms may be related to asthma so she used her inhaler however this did not help. She states she also started using Claritin-D and Flonase without significant relief. She denies sick contacts or exposure. She took a COVID test which was negative. She denies fever, chills, chest pain, shortness of breath, abdominal pain, nausea vomiting diarrhea, ear pain, difficulty swallowing. Prior to Admission Medications   Prescriptions Last Dose Informant Patient Reported? Taking?    Lidocaine Viscous HCl (XYLOCAINE) 2 % mucosal solution   No No   Sig: Swish and spit 15 mL 4 (four) times a day as needed for mouth pain or discomfort   albuterol (PROVENTIL HFA,VENTOLIN HFA) 90 mcg/act inhaler   No No   Sig: Inhale 2 puffs every 6 (six) hours as needed for wheezing   cetirizine (ZyrTEC) 10 mg tablet   No No   Sig: Take 1 tablet (10 mg total) by mouth daily   fluticasone (FLONASE) 50 mcg/act nasal spray   No No   Si sprays into each nostril 2 (two) times a day   hydrOXYzine HCL (ATARAX) 50 mg tablet   No No   Sig: Take 1 tablet (50 mg total) by mouth 3 (three) times a day as needed for itching   montelukast (SINGULAIR) 10 mg tablet   No No   Sig: TAKE 1 TABLET(10 MG) BY MOUTH DAILY AT BEDTIME   omeprazole (PriLOSEC) 40 MG capsule   No No   Sig: Take 1 capsule (40 mg total) by mouth daily      Facility-Administered Medications: None       Past Medical History:   Diagnosis Date   • Anxiety    • Asthma    • Gastritis        Past Surgical History:   Procedure Laterality Date   • ENDOMETRIAL ABLATION W/ NOVASURE         Family History   Problem Relation Age of Onset   • Cancer Mother    • No Known Problems Father      I have reviewed and agree with the history as documented. E-Cigarette/Vaping   • E-Cigarette Use Never User      E-Cigarette/Vaping Substances     Social History     Tobacco Use   • Smoking status: Some Days     Packs/day: 0.25     Types: Cigarettes   • Smokeless tobacco: Never   Vaping Use   • Vaping Use: Never used   Substance Use Topics   • Alcohol use: Yes     Comment: socially   • Drug use: No       Review of Systems   Constitutional: Negative for chills and fever. HENT: Positive for congestion, postnasal drip, rhinorrhea, sinus pressure, sinus pain and sore throat. Negative for ear pain. Eyes: Negative for pain and visual disturbance. Respiratory: Positive for cough. Negative for shortness of breath. Cardiovascular: Negative for chest pain. Gastrointestinal: Negative for abdominal pain, diarrhea, nausea and vomiting. Genitourinary: Negative for decreased urine volume and difficulty urinating. Musculoskeletal: Negative for arthralgias and back pain. Skin: Negative for color change and rash. Neurological: Negative for syncope and headaches. All other systems reviewed and are negative. Physical Exam  Physical Exam  Vitals and nursing note reviewed. Constitutional:       General: She is not in acute distress. Appearance: She is well-developed. HENT:      Head: Normocephalic and atraumatic.       Right Ear: Tympanic membrane, ear canal and external ear normal.      Left Ear: Tympanic membrane, ear canal and external ear normal. Nose: Congestion and rhinorrhea present. Right Sinus: Maxillary sinus tenderness present. Left Sinus: Maxillary sinus tenderness present. Mouth/Throat:      Mouth: Mucous membranes are moist. No oral lesions. Pharynx: Oropharynx is clear. Uvula midline. Posterior oropharyngeal erythema present. No pharyngeal swelling, oropharyngeal exudate or uvula swelling. Tonsils: No tonsillar exudate or tonsillar abscesses. Comments: Erythema noted to the posterior oropharynx without exudate, swelling, vesicles, petechiae. Post nasal drip noted. Handles oral secretions well without difficulty. No sign of PTA. Eyes:      Conjunctiva/sclera: Conjunctivae normal.   Cardiovascular:      Rate and Rhythm: Regular rhythm. Tachycardia present. Heart sounds: Normal heart sounds. No murmur heard. Comments: Mild tachycardia however patient just used albuterol inhaler prior to arrival.  Pulmonary:      Effort: Pulmonary effort is normal. No respiratory distress. Breath sounds: Normal breath sounds. No stridor. No wheezing, rhonchi or rales. Comments: Dry cough present  Chest:      Chest wall: No tenderness. Abdominal:      General: Abdomen is flat. Bowel sounds are normal. There is no distension. Palpations: Abdomen is soft. Tenderness: There is no abdominal tenderness. Musculoskeletal:         General: No swelling. Cervical back: Normal range of motion and neck supple. Skin:     General: Skin is warm and dry. Capillary Refill: Capillary refill takes less than 2 seconds. Neurological:      Mental Status: She is alert.    Psychiatric:         Mood and Affect: Mood normal.         Vital Signs  ED Triage Vitals [08/17/23 1524]   Temperature Pulse Respirations Blood Pressure SpO2   98.3 °F (36.8 °C) (!) 113 18 158/88 96 %      Temp Source Heart Rate Source Patient Position - Orthostatic VS BP Location FiO2 (%)   Oral Monitor Sitting Right arm --      Pain Score --           Vitals:    08/17/23 1524   BP: 158/88   Pulse: (!) 113   Patient Position - Orthostatic VS: Sitting         Visual Acuity      ED Medications  Medications - No data to display    Diagnostic Studies  Results Reviewed     Procedure Component Value Units Date/Time    Strep A PCR [[id number]]  (Normal) Collected: 08/17/23 1640    Lab Status: Final result Specimen: Throat Updated: 08/17/23 1714     STREP A PCR Not Detected                 No orders to display              Procedures  Procedures         ED Course                                             Medical Decision Making  Suspect allergic rhinitis/seasonal allergies, bronchospasms, sinusitis. Will give short course of prednisone. Discussed continuing Claritin-D or similar over-the-counter and Flonase. We will send strep test.  Explained she will be contacted if positive result. We will send course of doxycycline for patient to start if she continues with severe sinus pain/pressure, fever, worsening symptoms. The management plan was discussed in detail with the patient at bedside and all questions were answered. Estella Oppenheim to discharge, we provided both verbal and written instructions.  We discussed with the patient the signs and symptoms for which to return to the emergency department.  All questions were answered and patient was comfortable with the plan of care and discharged to home.  Instructed the patient to follow up with the primary care provider and/or specialist provided and their written instructions.  The patient verbalized understanding of our discussion and plan of care, and agrees to return to the Emergency Department for concerns and progression of illness.     At discharge, I instructed the patient to:  -follow up with pcp  -follow up with the recommended specialists  -return to the ER if symptoms worsened or new symptoms arose  Patient agreed to this plan and was stable at time of discharge.      Allergic rhinitis: acute illness or injury  Cough: acute illness or injury  Sinusitis: acute illness or injury  Amount and/or Complexity of Data Reviewed  Labs: ordered. Risk  Prescription drug management. Disposition  Final diagnoses: Allergic rhinitis   Sinusitis   Cough     Time reflects when diagnosis was documented in both MDM as applicable and the Disposition within this note     Time User Action Codes Description Comment    8/17/2023  4:22 PM Radcliffe Harrow Add [J30.9] Allergic rhinitis     8/17/2023  4:22 PM Radcliffe Harrow Add [J32.9] Sinusitis     8/17/2023  4:23 PM Radcliffe Harrow Add [R05.9] Cough       ED Disposition     ED Disposition   Discharge    Condition   Stable    Date/Time   Thu Aug 17, 2023  4:22 PM    Comment   Anam Burch discharge to home/self care.                Follow-up Information     Follow up With Specialties Details Why Contact Info Additional Nestor Garsia [facility], Nurse Practitioner Schedule an appointment as soon as possible for a visit in 1 day  [address] [facility]       [address] [facility] Emergency Department Emergency Medicine  If symptoms worsen [address] [phone]  [address] [facility] Emergency Department, [address] [place], [place], [address]          Discharge Medication List as of 8/17/2023  4:25 PM      START taking these medications    Details   doxycycline hyclate (VIBRAMYCIN) 100 mg capsule Take 1 capsule (100 mg total) by mouth 2 (two) times a day for 7 days, Starting Thu 8/17/2023, Until Thu 8/24/2023, Normal      predniSONE 50 mg tablet Take 1 tablet (50 mg total) by mouth daily for 5 days, Starting Thu 8/17/2023, Until Tue 8/22/2023, Normal         CONTINUE these medications which have NOT CHANGED    Details   albuterol (PROVENTIL HFA,VENTOLIN HFA) 90 mcg/act inhaler Inhale 2 puffs every 6 (six) hours as needed for wheezing, Starting Thu 6/22/2023, Normal      cetirizine (ZyrTEC) 10 mg tablet Take 1 tablet (10 mg total) by mouth daily, Starting Thu 6/22/2023, Until Wed 9/20/2023, Normal      fluticasone (FLONASE) 50 mcg/act nasal spray 2 sprays into each nostril 2 (two) times a day, Starting Mon 5/1/2023, Normal      hydrOXYzine HCL (ATARAX) 50 mg tablet Take 1 tablet (50 mg total) by mouth 3 (three) times a day as needed for itching, Starting Sat 10/15/2022, Normal      Lidocaine Viscous HCl (XYLOCAINE) 2 % mucosal solution Swish and spit 15 mL 4 (four) times a day as needed for mouth pain or discomfort, Starting Thu 11/10/2022, Normal      montelukast (SINGULAIR) 10 mg tablet TAKE 1 TABLET(10 MG) BY MOUTH DAILY AT BEDTIME, Starting Fri 7/28/2023, Normal      omeprazole (PriLOSEC) 40 MG capsule Take 1 capsule (40 mg total) by mouth daily, Starting Mon 7/3/2023, Normal             No discharge procedures on file.     PDMP Review     None          ED Provider  Electronically Signed by           Dion Brown PA-C  08/17/23 2041

## 2023-08-18 NOTE — ASSESSMENT & PLAN NOTE
Symptoms more suggestive of sinus congestion rather than sinus infection with bilateral symptoms, no fever, and symptoms for 3-4 days only. - Recommend increased fluid intake. - Trial Afrin Severe Congestion for no more than 3 days. Cautioned pt regarding rebound congestion.   - Continue prednisone as prescribed by ED yesterday. - As previously instructed by ED provider, begin doxycycline if no improvement in symptoms. Educated pt regarding the indications for doxycycline.   - Continue albuterol PRN for SOB.
none

## 2023-10-02 DIAGNOSIS — K29.50 CHRONIC GASTRITIS WITHOUT BLEEDING, UNSPECIFIED GASTRITIS TYPE: ICD-10-CM

## 2023-10-02 RX ORDER — OMEPRAZOLE 40 MG/1
40 CAPSULE, DELAYED RELEASE ORAL DAILY
Qty: 90 CAPSULE | Refills: 0 | Status: SHIPPED | OUTPATIENT
Start: 2023-10-02

## 2023-10-26 ENCOUNTER — OFFICE VISIT (OUTPATIENT)
Dept: FAMILY MEDICINE CLINIC | Facility: CLINIC | Age: 53
End: 2023-10-26

## 2023-10-26 VITALS
HEART RATE: 99 BPM | SYSTOLIC BLOOD PRESSURE: 138 MMHG | WEIGHT: 163 LBS | OXYGEN SATURATION: 95 % | BODY MASS INDEX: 27.16 KG/M2 | HEIGHT: 65 IN | DIASTOLIC BLOOD PRESSURE: 84 MMHG | TEMPERATURE: 98.6 F | RESPIRATION RATE: 18 BRPM

## 2023-10-26 DIAGNOSIS — J01.11 ACUTE RECURRENT FRONTAL SINUSITIS: Primary | ICD-10-CM

## 2023-10-26 PROBLEM — R09.81 SINUS CONGESTION: Status: RESOLVED | Noted: 2023-08-18 | Resolved: 2023-10-26

## 2023-10-26 PROCEDURE — 99213 OFFICE O/P EST LOW 20 MIN: CPT

## 2023-10-26 RX ORDER — DOXYCYCLINE 100 MG/1
100 TABLET ORAL 2 TIMES DAILY
Qty: 14 TABLET | Refills: 0 | Status: CANCELLED | OUTPATIENT
Start: 2023-10-26 | End: 2023-11-02

## 2023-10-26 RX ORDER — AZELASTINE 1 MG/ML
1 SPRAY, METERED NASAL 2 TIMES DAILY
Qty: 30 ML | Refills: 1 | Status: SHIPPED | OUTPATIENT
Start: 2023-10-26

## 2023-10-26 RX ORDER — FLUTICASONE PROPIONATE 50 MCG
2 SPRAY, SUSPENSION (ML) NASAL 2 TIMES DAILY
Qty: 16 G | Refills: 2 | Status: CANCELLED | OUTPATIENT
Start: 2023-10-26

## 2023-10-26 RX ORDER — AMOXICILLIN AND CLAVULANATE POTASSIUM 875; 125 MG/1; MG/1
1 TABLET, FILM COATED ORAL EVERY 12 HOURS SCHEDULED
Qty: 14 TABLET | Refills: 0 | Status: SHIPPED | OUTPATIENT
Start: 2023-10-26 | End: 2023-11-02

## 2023-10-26 NOTE — PROGRESS NOTES
Name: Walt Gonsales      : 1970      MRN: [id number]  Encounter Provider: TERESA Downey  Encounter Date: 10/26/2023   Encounter department: [address] [facility]     1. Acute recurrent frontal sinusitis  Assessment & Plan:  Pt adamant that this is a bacterial infection. Hx of being treated for recurrent ABRS. Start augmentin. Discussed risks associated with frequent abx use. Discussed supportive management. F/u PCP. Orders:  -     sodium chloride (OCEAN) 0.65 % nasal spray; 1 spray into each nostril as needed for congestion or rhinitis  -     azelastine (ASTELIN) 0.1 % nasal spray; 1 spray into each nostril 2 (two) times a day Use in each nostril as directed  -     amoxicillin-clavulanate (AUGMENTIN) 875-125 mg per tablet; Take 1 tablet by mouth every 12 (twelve) hours for 7 days           Subjective      Walt Gonsales is a 48 y.o. female  has a past medical history of Anxiety, Asthma, and Gastritis. has a past surgical history that includes Endometrial ablation w/ novasure. Sinus Pain  Patient complains of clear rhinorrhea, congestion, cough, headaches, itchy eyes, itchy nose, nasal congestion, post nasal drip, puffiness of the eyes, sinus pressure, sneezing, and sore throat. Onset of symptoms was 3 days ago. She has tried flonase BID and claritin without relief. Review of Systems   Constitutional: Negative. Negative for chills and fever. HENT:  Positive for congestion, postnasal drip, rhinorrhea, sinus pressure, sinus pain, sneezing and sore throat. Negative for ear pain. Eyes: Negative. Negative for pain and visual disturbance. Respiratory: Negative. Negative for cough and shortness of breath. Cardiovascular: Negative. Negative for chest pain and palpitations. Gastrointestinal: Negative. Negative for abdominal pain and vomiting. Endocrine: Negative. Genitourinary: Negative. Negative for dysuria and hematuria. Musculoskeletal: Negative. Negative for arthralgias and back pain. Skin: Negative. Negative for color change and rash. Allergic/Immunologic: Positive for environmental allergies. Neurological: Negative. Negative for dizziness, seizures and syncope. Hematological: Negative. Psychiatric/Behavioral: Negative. All other systems reviewed and are negative. Current Outpatient Medications on File Prior to Visit   Medication Sig    albuterol (PROVENTIL HFA,VENTOLIN HFA) 90 mcg/act inhaler Inhale 2 puffs every 6 (six) hours as needed for wheezing    fluticasone (FLONASE) 50 mcg/act nasal spray 2 sprays into each nostril 2 (two) times a day    montelukast (SINGULAIR) 10 mg tablet TAKE 1 TABLET(10 MG) BY MOUTH DAILY AT BEDTIME    omeprazole (PriLOSEC) 40 MG capsule Take 1 capsule (40 mg total) by mouth daily    [DISCONTINUED] cetirizine (ZyrTEC) 10 mg tablet Take 1 tablet (10 mg total) by mouth daily    [DISCONTINUED] hydrOXYzine HCL (ATARAX) 50 mg tablet Take 1 tablet (50 mg total) by mouth 3 (three) times a day as needed for itching    [DISCONTINUED] Lidocaine Viscous HCl (XYLOCAINE) 2 % mucosal solution Swish and spit 15 mL 4 (four) times a day as needed for mouth pain or discomfort       Objective     /84 (BP Location: Right arm, Patient Position: Sitting, Cuff Size: Standard)   Pulse 99   Temp 98.6 °F (37 °C) (Temporal)   Resp 18   Ht 5' 5" (1.651 m)   Wt 73.9 kg (163 lb)   LMP  (LMP Unknown)   SpO2 95%   BMI 27.12 kg/m²     Physical Exam  Vitals and nursing note reviewed. Constitutional:       Appearance: She is overweight. HENT:      Head: Normocephalic and atraumatic. Right Ear: Ear canal and external ear normal. A middle ear effusion is present. Left Ear: Ear canal and external ear normal. A middle ear effusion is present. Nose: Nasal tenderness, mucosal edema, congestion and rhinorrhea present.  No nasal deformity, septal deviation, signs of injury or laceration. Right Sinus: Maxillary sinus tenderness and frontal sinus tenderness present. Left Sinus: Maxillary sinus tenderness and frontal sinus tenderness present. Eyes:      Extraocular Movements: Extraocular movements intact. Conjunctiva/sclera: Conjunctivae normal.      Pupils: Pupils are equal, round, and reactive to light. Cardiovascular:      Rate and Rhythm: Normal rate and regular rhythm. Pulses: Normal pulses. Heart sounds: Normal heart sounds. Pulmonary:      Effort: Pulmonary effort is normal.      Breath sounds: Normal breath sounds. Abdominal:      General: Bowel sounds are normal.      Palpations: Abdomen is soft. Tenderness: There is no abdominal tenderness. Musculoskeletal:         General: Normal range of motion. Cervical back: Normal range of motion. Skin:     General: Skin is warm and dry. Neurological:      General: No focal deficit present. Mental Status: She is alert and oriented to person, place, and time. Mental status is at baseline. Psychiatric:         Mood and Affect: Mood normal.         Behavior: Behavior normal.         Thought Content:  Thought content normal.         Judgment: Judgment normal.     Shabnam Adorno

## 2023-10-26 NOTE — ASSESSMENT & PLAN NOTE
Pt adamant that this is a bacterial infection. Hx of being treated for recurrent ABRS. Start augmentin. Discussed risks associated with frequent abx use. Discussed supportive management. F/u PCP.

## 2023-10-30 ENCOUNTER — HOSPITAL ENCOUNTER (EMERGENCY)
Facility: HOSPITAL | Age: 53
Discharge: HOME | End: 2023-10-30
Attending: EMERGENCY MEDICINE
Payer: COMMERCIAL

## 2023-10-30 ENCOUNTER — APPOINTMENT (EMERGENCY)
Dept: RADIOLOGY | Facility: HOSPITAL | Age: 53
End: 2023-10-30
Payer: COMMERCIAL

## 2023-10-30 VITALS
RESPIRATION RATE: 20 BRPM | DIASTOLIC BLOOD PRESSURE: 69 MMHG | SYSTOLIC BLOOD PRESSURE: 128 MMHG | BODY MASS INDEX: 32.03 KG/M2 | TEMPERATURE: 98.4 F | HEART RATE: 95 BPM | OXYGEN SATURATION: 96 % | WEIGHT: 192.46 LBS

## 2023-10-30 DIAGNOSIS — R06.2 WHEEZING: ICD-10-CM

## 2023-10-30 DIAGNOSIS — J32.9 SINUSITIS: ICD-10-CM

## 2023-10-30 DIAGNOSIS — R05.9 COUGH: Primary | ICD-10-CM

## 2023-10-30 LAB
ANION GAP SERPL CALCULATED.3IONS-SCNC: 6 MMOL/L
ATRIAL RATE: 94 BPM
BASOPHILS # BLD AUTO: 0.02 THOUSANDS/ÂΜL (ref 0–0.1)
BASOPHILS NFR BLD AUTO: 0 % (ref 0–1)
BUN SERPL-MCNC: 19 MG/DL (ref 5–25)
CALCIUM SERPL-MCNC: 9.7 MG/DL (ref 8.4–10.2)
CARDIAC TROPONIN I PNL SERPL HS: 3 NG/L
CHLORIDE SERPL-SCNC: 106 MMOL/L (ref 96–108)
CO2 SERPL-SCNC: 24 MMOL/L (ref 21–32)
CREAT SERPL-MCNC: 0.47 MG/DL (ref 0.6–1.3)
EOSINOPHIL # BLD AUTO: 0.06 THOUSAND/ÂΜL (ref 0–0.61)
EOSINOPHIL NFR BLD AUTO: 1 % (ref 0–6)
ERYTHROCYTE [DISTWIDTH] IN BLOOD BY AUTOMATED COUNT: 13.4 % (ref 11.6–15.1)
GFR SERPL CREATININE-BSD FRML MDRD: 113 ML/MIN/1.73SQ M
GLUCOSE SERPL-MCNC: 113 MG/DL (ref 65–140)
HCT VFR BLD AUTO: 42.1 % (ref 34.8–46.1)
HGB BLD-MCNC: 14 G/DL (ref 11.5–15.4)
IMM GRANULOCYTES # BLD AUTO: 0.03 THOUSAND/UL (ref 0–0.2)
IMM GRANULOCYTES NFR BLD AUTO: 0 % (ref 0–2)
LYMPHOCYTES # BLD AUTO: 1.99 THOUSANDS/ÂΜL (ref 0.6–4.47)
LYMPHOCYTES NFR BLD AUTO: 24 % (ref 14–44)
MCH RBC QN AUTO: 26.7 PG (ref 26.8–34.3)
MCHC RBC AUTO-ENTMCNC: 33.3 G/DL (ref 31.4–37.4)
MCV RBC AUTO: 80 FL (ref 82–98)
MONOCYTES # BLD AUTO: 0.45 THOUSAND/ÂΜL (ref 0.17–1.22)
MONOCYTES NFR BLD AUTO: 6 % (ref 4–12)
NEUTROPHILS # BLD AUTO: 5.64 THOUSANDS/ÂΜL (ref 1.85–7.62)
NEUTS SEG NFR BLD AUTO: 69 % (ref 43–75)
NRBC BLD AUTO-RTO: 0 /100 WBCS
P AXIS: 15 DEGREES
PLATELET # BLD AUTO: 109 THOUSANDS/UL (ref 149–390)
PMV BLD AUTO: 11.3 FL (ref 8.9–12.7)
POTASSIUM SERPL-SCNC: 4 MMOL/L (ref 3.5–5.3)
PR INTERVAL: 132 MS
QRS AXIS: 57 DEGREES
QRSD INTERVAL: 80 MS
QT INTERVAL: 328 MS
QTC INTERVAL: 407 MS
RBC # BLD AUTO: 5.25 MILLION/UL (ref 3.81–5.12)
SODIUM SERPL-SCNC: 136 MMOL/L (ref 135–147)
T WAVE AXIS: 57 DEGREES
VENTRICULAR RATE: 93 BPM
WBC # BLD AUTO: 8.19 THOUSAND/UL (ref 4.31–10.16)

## 2023-10-30 PROCEDURE — 71046 X-RAY EXAM CHEST 2 VIEWS: CPT

## 2023-10-30 PROCEDURE — 99285 EMERGENCY DEPT VISIT HI MDM: CPT

## 2023-10-30 PROCEDURE — 94640 AIRWAY INHALATION TREATMENT: CPT

## 2023-10-30 PROCEDURE — 36415 COLL VENOUS BLD VENIPUNCTURE: CPT | Performed by: PHYSICIAN ASSISTANT

## 2023-10-30 PROCEDURE — 85025 COMPLETE CBC W/AUTO DIFF WBC: CPT | Performed by: PHYSICIAN ASSISTANT

## 2023-10-30 PROCEDURE — 84484 ASSAY OF TROPONIN QUANT: CPT | Performed by: PHYSICIAN ASSISTANT

## 2023-10-30 PROCEDURE — 93010 ELECTROCARDIOGRAM REPORT: CPT | Performed by: INTERNAL MEDICINE

## 2023-10-30 PROCEDURE — 93005 ELECTROCARDIOGRAM TRACING: CPT

## 2023-10-30 PROCEDURE — 99285 EMERGENCY DEPT VISIT HI MDM: CPT | Performed by: PHYSICIAN ASSISTANT

## 2023-10-30 PROCEDURE — 80048 BASIC METABOLIC PNL TOTAL CA: CPT | Performed by: PHYSICIAN ASSISTANT

## 2023-10-30 RX ORDER — BENZONATATE 100 MG/1
100 CAPSULE ORAL 3 TIMES DAILY PRN
Qty: 21 CAPSULE | Refills: 0 | Status: SHIPPED | OUTPATIENT
Start: 2023-10-30

## 2023-10-30 RX ORDER — PREDNISONE 20 MG/1
40 TABLET ORAL ONCE
Status: COMPLETED | OUTPATIENT
Start: 2023-10-30 | End: 2023-10-30

## 2023-10-30 RX ORDER — IPRATROPIUM BROMIDE AND ALBUTEROL SULFATE 2.5; .5 MG/3ML; MG/3ML
3 SOLUTION RESPIRATORY (INHALATION) ONCE
Status: COMPLETED | OUTPATIENT
Start: 2023-10-30 | End: 2023-10-30

## 2023-10-30 RX ORDER — PREDNISONE 20 MG/1
20 TABLET ORAL DAILY
Qty: 4 TABLET | Refills: 0 | Status: SHIPPED | OUTPATIENT
Start: 2023-10-31 | End: 2023-11-04

## 2023-10-30 RX ADMIN — PREDNISONE 40 MG: 20 TABLET ORAL at 12:51

## 2023-10-30 RX ADMIN — IPRATROPIUM BROMIDE AND ALBUTEROL SULFATE 3 ML: 2.5; .5 SOLUTION RESPIRATORY (INHALATION) at 12:52

## 2023-10-30 NOTE — Clinical Note
Zee Bower was seen and treated in our emergency department on 10/30/2023. Diagnosis:     Toshia  . She may return on this date: 11/01/2023         If you have any questions or concerns, please don't hesitate to call.       Lopez Altamirano PA-C    ______________________________           _______________          _______________  Hospital Representative                              Date                                Time

## 2023-10-31 NOTE — ED PROVIDER NOTES
History  Chief Complaint   Patient presents with    Sinus Problem     Coming in from home, via EMS, sinus infection x1 week, used albuterol inhaler, x1 hour still has SOB, lungs CTA     Avila Dejesus is a 49 yo F, history of asthma, anxiety, presenting with ongoing symptoms of sinus infection over the past 5 or so days including congestion/sinus pressure, as well as cough, wheezing, and chest tightness. Reports she is currently on augmentin and fluticasone nasal spray for the sinusitis. She notes wheezing/chest tightness over the past few days for which she has been using prescribed albuterol every few hours. No fevers/chills noted. The chest tightness reportedly feels like previous asthma exacerbations. History provided by:  Patient   used: No        Prior to Admission Medications   Prescriptions Last Dose Informant Patient Reported? Taking?    albuterol (PROVENTIL HFA,VENTOLIN HFA) 90 mcg/act inhaler   No No   Sig: Inhale 2 puffs every 6 (six) hours as needed for wheezing   amoxicillin-clavulanate (AUGMENTIN) 875-125 mg per tablet   No No   Sig: Take 1 tablet by mouth every 12 (twelve) hours for 7 days   azelastine (ASTELIN) 0.1 % nasal spray   No No   Si spray into each nostril 2 (two) times a day Use in each nostril as directed   fluticasone (FLONASE) 50 mcg/act nasal spray   No No   Si sprays into each nostril 2 (two) times a day   montelukast (SINGULAIR) 10 mg tablet   No No   Sig: TAKE 1 TABLET(10 MG) BY MOUTH DAILY AT BEDTIME   omeprazole (PriLOSEC) 40 MG capsule   No No   Sig: Take 1 capsule (40 mg total) by mouth daily   sodium chloride (OCEAN) 0.65 % nasal spray   No No   Si spray into each nostril as needed for congestion or rhinitis      Facility-Administered Medications: None       Past Medical History:   Diagnosis Date    Anxiety     Asthma     Gastritis        Past Surgical History:   Procedure Laterality Date    ENDOMETRIAL ABLATION W/ NOVASURE         Family History Problem Relation Age of Onset    Cancer Mother     No Known Problems Father      I have reviewed and agree with the history as documented. E-Cigarette/Vaping    E-Cigarette Use Never User      E-Cigarette/Vaping Substances     Social History     Tobacco Use    Smoking status: Every Day     Packs/day: 0.25     Types: Cigarettes     Passive exposure: Current    Smokeless tobacco: Never   Vaping Use    Vaping Use: Never used   Substance Use Topics    Alcohol use: Yes     Comment: socially    Drug use: No       Review of Systems   Constitutional:  Negative for chills and fever. HENT:  Positive for congestion, rhinorrhea, sinus pressure and sinus pain. Negative for ear discharge, ear pain and sore throat. Eyes:  Negative for pain and visual disturbance. Respiratory:  Positive for chest tightness, shortness of breath and wheezing. Negative for cough. Cardiovascular:  Negative for palpitations and leg swelling. Gastrointestinal:  Negative for abdominal pain, nausea and vomiting. Genitourinary:  Negative for dysuria, frequency and urgency. Musculoskeletal:  Negative for back pain, neck pain and neck stiffness. Skin:  Negative for rash and wound. Neurological:  Negative for dizziness, weakness, light-headedness and numbness. Physical Exam  Physical Exam  Constitutional:       General: She is not in acute distress. Appearance: She is well-developed. She is not diaphoretic. HENT:      Head: Normocephalic and atraumatic. Right Ear: Tympanic membrane, ear canal and external ear normal.      Left Ear: Tympanic membrane, ear canal and external ear normal.      Nose: Congestion present. Eyes:      Extraocular Movements:      Right eye: Normal extraocular motion. Left eye: Normal extraocular motion. Conjunctiva/sclera: Conjunctivae normal.      Pupils: Pupils are equal, round, and reactive to light. Cardiovascular:      Rate and Rhythm: Normal rate and regular rhythm. Comments: No lower extremity edema/calf TTP  Pulmonary:      Effort: Pulmonary effort is normal. No accessory muscle usage or respiratory distress. Breath sounds: Wheezing (trace, expiratory) present. No rhonchi or rales. Abdominal:      General: Abdomen is flat. There is no distension. Tenderness: There is no abdominal tenderness. There is no right CVA tenderness, left CVA tenderness or guarding. Musculoskeletal:      Cervical back: Normal range of motion. No rigidity. Skin:     General: Skin is warm and dry. Capillary Refill: Capillary refill takes less than 2 seconds. Findings: No erythema or rash. Neurological:      Mental Status: She is alert and oriented to person, place, and time. Motor: No abnormal muscle tone. Coordination: Coordination normal.   Psychiatric:         Behavior: Behavior normal.         Thought Content:  Thought content normal.         Judgment: Judgment normal.         Vital Signs  ED Triage Vitals   Temperature Pulse Respirations Blood Pressure SpO2   10/30/23 1114 10/30/23 1111 10/30/23 1111 10/30/23 1111 10/30/23 1111   98.4 °F (36.9 °C) (!) 108 20 148/87 99 %      Temp Source Heart Rate Source Patient Position - Orthostatic VS BP Location FiO2 (%)   10/30/23 1111 10/30/23 1111 10/30/23 1111 10/30/23 1111 --   Oral Monitor Sitting Right arm       Pain Score       10/30/23 1111       No Pain           Vitals:    10/30/23 1111 10/30/23 1409   BP: 148/87 128/69   Pulse: (!) 108 95   Patient Position - Orthostatic VS: Sitting Sitting         Visual Acuity      ED Medications  Medications   ipratropium-albuterol (DUO-NEB) 0.5-2.5 mg/3 mL inhalation solution 3 mL (3 mL Nebulization Given 10/30/23 1252)   predniSONE tablet 40 mg (40 mg Oral Given 10/30/23 1251)       Diagnostic Studies  Results Reviewed       Procedure Component Value Units Date/Time    HS Troponin 0hr (reflex protocol) [[id number]]  (Normal) Collected: 10/30/23 1257    Lab Status: Final result Specimen: Blood from Arm, Left Updated: 10/30/23 1329     hs TnI 0hr 3 ng/L     Basic metabolic panel [[id number]]  (Abnormal) Collected: 10/30/23 1257    Lab Status: Final result Specimen: Blood from Arm, Left Updated: 10/30/23 1326     Sodium 136 mmol/L      Potassium 4.0 mmol/L      Chloride 106 mmol/L      CO2 24 mmol/L      ANION GAP 6 mmol/L      BUN 19 mg/dL      Creatinine 0.47 mg/dL      Glucose 113 mg/dL      Calcium 9.7 mg/dL      eGFR 113 ml/min/1.73sq m     Narrative:      [facility] (CKD):     Stage 1 with normal or high GFR (GFR > 90 mL/min/1.73 square meters)    Stage 2 Mild CKD (GFR = 60-89 mL/min/1.73 square meters)    Stage 3A Moderate CKD (GFR = 45-59 mL/min/1.73 square meters)    Stage 3B Moderate CKD (GFR = 30-44 mL/min/1.73 square meters)    Stage 4 Severe CKD (GFR = 15-29 mL/min/1.73 square meters)    Stage 5 End Stage CKD (GFR <15 mL/min/1.73 square meters)  Note: GFR calculation is accurate only with a steady state creatinine    CBC and differential [[id number]]  (Abnormal) Collected: 10/30/23 1257    Lab Status: Final result Specimen: Blood from Arm, Left Updated: 10/30/23 1313     WBC 8.19 Thousand/uL      RBC 5.25 Million/uL      Hemoglobin 14.0 g/dL      Hematocrit 42.1 %      MCV 80 fL      MCH 26.7 pg      MCHC 33.3 g/dL      RDW 13.4 %      MPV 11.3 fL      Platelets 344 Thousands/uL      nRBC 0 /100 WBCs      Neutrophils Relative 69 %      Immat GRANS % 0 %      Lymphocytes Relative 24 %      Monocytes Relative 6 %      Eosinophils Relative 1 %      Basophils Relative 0 %      Neutrophils Absolute 5.64 Thousands/µL      Immature Grans Absolute 0.03 Thousand/uL      Lymphocytes Absolute 1.99 Thousands/µL      Monocytes Absolute 0.45 Thousand/µL      Eosinophils Absolute 0.06 Thousand/µL      Basophils Absolute 0.02 Thousands/µL                    XR chest 2 views   ED Interpretation by Martha Winslow PA-C (10/30 3520)   No acute cardiopulmonary disease on my initial chest x-ray read        Final Result by Cristopher Torres MD (10/30 0776)      No active pulmonary disease. Workstation performed: [id number]                    Procedures  ECG 12 Lead Documentation Only    Date/Time: 10/30/2023 12:50 PM    Performed by: Yimi Johnson PA-C  Authorized by: Yimi Johnson PA-C    Indications / Diagnosis:  Chest tightness  ECG reviewed by me, the ED Provider: yes    Patient location:  ED  Previous ECG:     Previous ECG:  Unavailable    Comparison to cardiac monitor: Yes    Interpretation:     Interpretation: normal    Rate:     ECG rate:  93    ECG rate assessment: normal    Rhythm:     Rhythm: sinus rhythm    Ectopy:     Ectopy: none    QRS:     QRS axis:  Normal    QRS intervals:  Normal  Conduction:     Conduction: normal    ST segments:     ST segments:  Normal  T waves:     T waves: normal             ED Course                                             Medical Decision Making  Pt presenting with ongoing sinusitis symptoms, as well as reported wheezing/chest tightness over the past few days similar to prior asthma exacerbations. Notes sinusitis symptoms have been improving somewhat since onset. She has trace expiratory wheezing on exam, is otherwise afebrile, nontoxic, no distress. Vitals WNL. Workup obtained including CBC for WBC count, BMP for lytes, EKG/troponin for cardiac ischemia, WNL. Chest tightness favored to be 2/2 asthma/cough. Given prednisone/duoneb here with overall improvement in symptoms noted, will d/c with prednisone course, supportive care. Advised to finish prescribed course of augmentin. F/u with PCP recommended. Return to ED indications reviewed. Amount and/or Complexity of Data Reviewed  Labs: ordered. Radiology: ordered and independent interpretation performed. Risk  Prescription drug management.              Disposition  Final diagnoses:   Cough   Wheezing Sinusitis     Time reflects when diagnosis was documented in both MDM as applicable and the Disposition within this note       Time User Action Codes Description Comment    10/30/2023  2:19 PM Darius Davidsonhrie Add [R05.9] Cough     10/30/2023  2:19 PM Darius Cruzie Add [R06.2] Wheezing     10/30/2023  2:19 PM Darius Ramires Add [J32.9] Sinusitis           ED Disposition       ED Disposition   Discharge    Condition   Stable    Date/Time   Mon Oct 30, 2023  2:19 PM    Comment   Margart Prime discharge to home/self care.                    Follow-up Information       Follow up With Specialties Details Why Contact Info Additional Information    [address] [facility] Emergency Department Emergency Medicine  If symptoms worsen [address] [address]  [address] [facility], [address] [place] [address], [address] [place] [address], Nurse Practitioner Schedule an appointment as soon as possible for a visit   [address] [address]  [phone]               Discharge Medication List as of 10/30/2023  2:20 PM        START taking these medications    Details   benzonatate (TESSALON PERLES) 100 mg capsule Take 1 capsule (100 mg total) by mouth 3 (three) times a day as needed for cough, Starting Mon 10/30/2023, Normal      predniSONE 20 mg tablet Take 1 tablet (20 mg total) by mouth daily for 4 days Do not start before October 31, 2023., Starting Tue 10/31/2023, Until Sat 11/4/2023, Normal           CONTINUE these medications which have NOT CHANGED    Details   albuterol (PROVENTIL HFA,VENTOLIN HFA) 90 mcg/act inhaler Inhale 2 puffs every 6 (six) hours as needed for wheezing, Starting Thu 6/22/2023, Normal      amoxicillin-clavulanate (AUGMENTIN) 875-125 mg per tablet Take 1 tablet by mouth every 12 (twelve) hours for 7 days, Starting Thu 10/26/2023, Until Thu 11/2/2023, Normal azelastine (ASTELIN) 0.1 % nasal spray 1 spray into each nostril 2 (two) times a day Use in each nostril as directed, Starting Thu 10/26/2023, Normal      fluticasone (FLONASE) 50 mcg/act nasal spray 2 sprays into each nostril 2 (two) times a day, Starting Mon 5/1/2023, Normal      montelukast (SINGULAIR) 10 mg tablet TAKE 1 TABLET(10 MG) BY MOUTH DAILY AT BEDTIME, Starting Fri 7/28/2023, Normal      omeprazole (PriLOSEC) 40 MG capsule Take 1 capsule (40 mg total) by mouth daily, Starting Mon 10/2/2023, Normal      sodium chloride (OCEAN) 0.65 % nasal spray 1 spray into each nostril as needed for congestion or rhinitis, Starting Thu 10/26/2023, Normal             No discharge procedures on file.     PDMP Review       None            ED Provider  Electronically Signed by             Asif Tse PA-C  10/31/23 8895

## 2023-12-04 ENCOUNTER — OFFICE VISIT (OUTPATIENT)
Dept: URGENT CARE | Age: 53
End: 2023-12-04
Payer: COMMERCIAL

## 2023-12-04 VITALS — HEART RATE: 108 BPM | TEMPERATURE: 99.2 F | OXYGEN SATURATION: 99 % | RESPIRATION RATE: 18 BRPM

## 2023-12-04 DIAGNOSIS — J01.00 ACUTE MAXILLARY SINUSITIS, RECURRENCE NOT SPECIFIED: Primary | ICD-10-CM

## 2023-12-04 PROCEDURE — G0382 LEV 3 HOSP TYPE B ED VISIT: HCPCS

## 2023-12-04 RX ORDER — AMOXICILLIN AND CLAVULANATE POTASSIUM 875; 125 MG/1; MG/1
1 TABLET, FILM COATED ORAL EVERY 12 HOURS SCHEDULED
Qty: 14 TABLET | Refills: 0 | Status: SHIPPED | OUTPATIENT
Start: 2023-12-04 | End: 2023-12-11

## 2023-12-04 NOTE — PROGRESS NOTES
[facility]        NAME: Susan Hernandez is a 48 y.o. female  : 1970    MRN: [id number]  DATE: 2023  TIME: 7:28 PM      Assessment and Plan     Acute maxillary sinusitis, recurrence not specified [J01.00]  1. Acute maxillary sinusitis, recurrence not specified  amoxicillin-clavulanate (AUGMENTIN) 875-125 mg per tablet            Patient Instructions     Take antibiotic as prescribed. Recommend probiotic use while taking antibiotic. Acetaminophen or ibuprofen for fever and pain. Follow-up with PCP in 3-5 days. Go to ER if symptoms worsen. Chief Complaint     Chief Complaint   Patient presents with    Nasal Congestion     Sinus pressure and congestion. Blowing green mucus out of nose. Started 3-4 days ago. Denies fevers. Aching at times         History of Present Illness     Patient is a 80-year-old female who presents with worsening congestion with sinus pain and pressure over the past 4 days. Reports discolored mucus. Reports history of sinus infections. Denies known sick contacts. Review of Systems     Review of Systems   Constitutional:  Positive for fever. HENT:  Positive for congestion, sinus pressure and sinus pain. Respiratory:  Negative for cough. Gastrointestinal:  Negative for diarrhea, nausea and vomiting. All other systems reviewed and are negative.         Current Medications       Current Outpatient Medications:     albuterol (PROVENTIL HFA,VENTOLIN HFA) 90 mcg/act inhaler, Inhale 2 puffs every 6 (six) hours as needed for wheezing, Disp: 96 g, Rfl: 5    amoxicillin-clavulanate (AUGMENTIN) 875-125 mg per tablet, Take 1 tablet by mouth every 12 (twelve) hours for 7 days, Disp: 14 tablet, Rfl: 0    azelastine (ASTELIN) 0.1 % nasal spray, 1 spray into each nostril 2 (two) times a day Use in each nostril as directed, Disp: 30 mL, Rfl: 1    benzonatate (TESSALON PERLES) 100 mg capsule, Take 1 capsule (100 mg total) by mouth 3 (three) times a day as needed for cough, Disp: 21 capsule, Rfl: 0    fluticasone (FLONASE) 50 mcg/act nasal spray, 2 sprays into each nostril 2 (two) times a day, Disp: 16 g, Rfl: 2    montelukast (SINGULAIR) 10 mg tablet, TAKE 1 TABLET(10 MG) BY MOUTH DAILY AT BEDTIME, Disp: 90 tablet, Rfl: 0    omeprazole (PriLOSEC) 40 MG capsule, Take 1 capsule (40 mg total) by mouth daily, Disp: 90 capsule, Rfl: 0    sodium chloride (OCEAN) 0.65 % nasal spray, 1 spray into each nostril as needed for congestion or rhinitis, Disp: 480 mL, Rfl: 0    Current Allergies     Allergies as of 12/04/2023 - Reviewed 12/04/2023   Allergen Reaction Noted    Z-arabella [azithromycin] Rash 10/17/2022    Other  12/14/2017              The following portions of the patient's history were reviewed and updated as appropriate: allergies, current medications, past family history, past medical history, past social history, past surgical history and problem list.     Past Medical History:   Diagnosis Date    Anxiety     Asthma     Gastritis        Past Surgical History:   Procedure Laterality Date    ENDOMETRIAL ABLATION W/ Ebony Rubio         Family History   Problem Relation Age of Onset    Cancer Mother     No Known Problems Father          Medications have been verified. Objective     Pulse (!) 108   Temp 99.2 °F (37.3 °C)   Resp 18   LMP  (LMP Unknown)   SpO2 99%   No LMP recorded (lmp unknown). Patient is postmenopausal.         Physical Exam     Physical Exam  Vitals and nursing note reviewed. Constitutional:       General: She is awake. She is not in acute distress. Appearance: Normal appearance. She is not ill-appearing, toxic-appearing or diaphoretic. HENT:      Right Ear: Ear canal and external ear normal. Tympanic membrane is erythematous. Tympanic membrane is not injected or bulging. Left Ear: Ear canal and external ear normal. Tympanic membrane is erythematous. Tympanic membrane is not injected or bulging.       Nose: Mucosal edema and congestion present. Right Sinus: Maxillary sinus tenderness and frontal sinus tenderness present. Left Sinus: Maxillary sinus tenderness and frontal sinus tenderness present. Cardiovascular:      Rate and Rhythm: Normal rate. Pulses: Normal pulses. Heart sounds: Normal heart sounds, S1 normal and S2 normal.   Pulmonary:      Effort: Pulmonary effort is normal.      Breath sounds: Normal breath sounds and air entry. Skin:     General: Skin is warm. Capillary Refill: Capillary refill takes less than 2 seconds. Neurological:      Mental Status: She is alert. Psychiatric:         Mood and Affect: Mood normal.         Behavior: Behavior normal.         Thought Content:  Thought content normal.         Judgment: Judgment normal.

## 2023-12-04 NOTE — PATIENT INSTRUCTIONS
Take antibiotic as prescribed. Recommend probiotic use while taking antibiotic. Acetaminophen or ibuprofen for fever and pain. Follow-up with PCP in 3-5 days. Go to ER if symptoms worsen.

## 2023-12-08 ENCOUNTER — HOSPITAL ENCOUNTER (EMERGENCY)
Facility: HOSPITAL | Age: 53
Discharge: HOME | End: 2023-12-08
Attending: EMERGENCY MEDICINE
Payer: COMMERCIAL

## 2023-12-08 VITALS
RESPIRATION RATE: 18 BRPM | TEMPERATURE: 98.2 F | SYSTOLIC BLOOD PRESSURE: 168 MMHG | HEART RATE: 102 BPM | OXYGEN SATURATION: 96 % | DIASTOLIC BLOOD PRESSURE: 78 MMHG

## 2023-12-08 DIAGNOSIS — J32.9 SINUSITIS: ICD-10-CM

## 2023-12-08 DIAGNOSIS — J06.9 VIRAL URI WITH COUGH: Primary | ICD-10-CM

## 2023-12-08 PROCEDURE — 99283 EMERGENCY DEPT VISIT LOW MDM: CPT

## 2023-12-08 PROCEDURE — 99284 EMERGENCY DEPT VISIT MOD MDM: CPT | Performed by: EMERGENCY MEDICINE

## 2023-12-08 PROCEDURE — 94640 AIRWAY INHALATION TREATMENT: CPT

## 2023-12-08 RX ORDER — PREDNISONE 20 MG/1
40 TABLET ORAL DAILY
Qty: 8 TABLET | Refills: 0 | Status: SHIPPED | OUTPATIENT
Start: 2023-12-08

## 2023-12-08 RX ORDER — OXYMETAZOLINE HYDROCHLORIDE 0.05 G/100ML
2 SPRAY NASAL ONCE
Status: DISCONTINUED | OUTPATIENT
Start: 2023-12-08 | End: 2023-12-08 | Stop reason: HOSPADM

## 2023-12-08 RX ORDER — PREDNISONE 20 MG/1
40 TABLET ORAL ONCE
Status: COMPLETED | OUTPATIENT
Start: 2023-12-08 | End: 2023-12-08

## 2023-12-08 RX ORDER — ALBUTEROL SULFATE 2.5 MG/3ML
5 SOLUTION RESPIRATORY (INHALATION) ONCE
Status: COMPLETED | OUTPATIENT
Start: 2023-12-08 | End: 2023-12-08

## 2023-12-08 RX ADMIN — ALBUTEROL SULFATE 5 MG: 2.5 SOLUTION RESPIRATORY (INHALATION) at 12:27

## 2023-12-08 RX ADMIN — PREDNISONE 40 MG: 20 TABLET ORAL at 12:27

## 2023-12-08 RX ADMIN — IPRATROPIUM BROMIDE 0.5 MG: 0.5 SOLUTION RESPIRATORY (INHALATION) at 12:28

## 2023-12-08 NOTE — ED NOTES
Dr. Pickard Knock aware via TT that pt has additional questions prior to d/c.      Lissette Nguyen RN  12/08/23 7527

## 2023-12-08 NOTE — Clinical Note
Kingsley Wei was seen and treated in our emergency department on 12/8/2023. Diagnosis:     Bruce Raya  may return to work on return date. She may return on this date: 12/12/2023         If you have any questions or concerns, please don't hesitate to call.       Nedra Young MD    ______________________________           _______________          _______________  Hospital Representative                              Date                                Time

## 2023-12-08 NOTE — Clinical Note
Walt Gonsales was seen and treated in our emergency department on 12/8/2023. Diagnosis:     Wyatt Siddiqi  may return to work on return date. She may return on this date: 12/12/2023         If you have any questions or concerns, please don't hesitate to call.       Wilfred Hart MD    ______________________________           _______________          _______________  Hospital Representative                              Date                                Time

## 2023-12-10 ENCOUNTER — APPOINTMENT (EMERGENCY)
Dept: RADIOLOGY | Facility: HOSPITAL | Age: 53
End: 2023-12-10
Payer: COMMERCIAL

## 2023-12-10 ENCOUNTER — HOSPITAL ENCOUNTER (EMERGENCY)
Facility: HOSPITAL | Age: 53
Discharge: HOME | End: 2023-12-10
Attending: EMERGENCY MEDICINE | Admitting: EMERGENCY MEDICINE
Payer: COMMERCIAL

## 2023-12-10 VITALS
BODY MASS INDEX: 28.07 KG/M2 | RESPIRATION RATE: 20 BRPM | WEIGHT: 168.65 LBS | SYSTOLIC BLOOD PRESSURE: 169 MMHG | TEMPERATURE: 98.2 F | HEART RATE: 100 BPM | DIASTOLIC BLOOD PRESSURE: 72 MMHG | OXYGEN SATURATION: 97 %

## 2023-12-10 DIAGNOSIS — R05.1 ACUTE COUGH: Primary | ICD-10-CM

## 2023-12-10 DIAGNOSIS — J40 BRONCHITIS: ICD-10-CM

## 2023-12-10 PROCEDURE — 99284 EMERGENCY DEPT VISIT MOD MDM: CPT | Performed by: EMERGENCY MEDICINE

## 2023-12-10 PROCEDURE — 71046 X-RAY EXAM CHEST 2 VIEWS: CPT

## 2023-12-10 PROCEDURE — 99283 EMERGENCY DEPT VISIT LOW MDM: CPT

## 2023-12-10 RX ORDER — BENZONATATE 100 MG/1
200 CAPSULE ORAL EVERY 8 HOURS
Qty: 21 CAPSULE | Refills: 0 | Status: SHIPPED | OUTPATIENT
Start: 2023-12-10 | End: 2023-12-14

## 2023-12-10 RX ORDER — BENZONATATE 100 MG/1
200 CAPSULE ORAL ONCE
Status: COMPLETED | OUTPATIENT
Start: 2023-12-10 | End: 2023-12-10

## 2023-12-10 RX ORDER — PREDNISONE 10 MG/1
TABLET ORAL
Qty: 45 TABLET | Refills: 0 | Status: SHIPPED | OUTPATIENT
Start: 2023-12-10 | End: 2023-12-13

## 2023-12-10 RX ORDER — LEVOFLOXACIN 500 MG/1
500 TABLET, FILM COATED ORAL DAILY
Qty: 7 TABLET | Refills: 0 | Status: SHIPPED | OUTPATIENT
Start: 2023-12-10 | End: 2023-12-17

## 2023-12-10 RX ADMIN — BENZONATATE 200 MG: 100 CAPSULE ORAL at 15:24

## 2023-12-10 NOTE — DISCHARGE INSTRUCTIONS
A  personal message from Dr. Rahul Irving,  Thank you so much for allowing me to care for you today. I pride myself in the care and attention I give all my patients. I hope you were a witness to this tonight. If for any reason your condition does not improve or worsens, or you have a question that was not answered during your visit you can feel free to text me on my personal phone #  # [phone]. I will answer to your message and continue your care past your emergency room visit. Please understand that although you are being discharged because your condition has been deemed stable and able to be managed on an outpatient setting. However your condition may worsen as part of the natural progression of the illness/condition, if this occurs please come back to the emergency department for a repeat evaluation.

## 2023-12-10 NOTE — ED PROVIDER NOTES
History  Chief Complaint   Patient presents with    Cough     Pt here two days ago, dx with viral URI. Reports taking prednisone without relief along with abx. Reports still having dry cough and increased production of mucous. PERSISTENT COUGH, POST NASAL DRIP AND CONGESTION  LAST XRAY DONE END OF OCTOBER WAS NORMAL  PATIENT ON 20 MG PREDNISONE AND AUGMENTIN AT THIS TIME, AFTER ER VISIT FOR SAME         History provided by:  Patient   used: No    Cough  Associated symptoms: shortness of breath    Associated symptoms: no chest pain, no chills, no ear pain, no fever, no rash and no sore throat        Prior to Admission Medications   Prescriptions Last Dose Informant Patient Reported? Taking?    albuterol (PROVENTIL HFA,VENTOLIN HFA) 90 mcg/act inhaler   No No   Sig: Inhale 2 puffs every 6 (six) hours as needed for wheezing   amoxicillin-clavulanate (AUGMENTIN) 875-125 mg per tablet   No No   Sig: Take 1 tablet by mouth every 12 (twelve) hours for 7 days   azelastine (ASTELIN) 0.1 % nasal spray   No No   Si spray into each nostril 2 (two) times a day Use in each nostril as directed   benzonatate (TESSALON PERLES) 100 mg capsule   No No   Sig: Take 1 capsule (100 mg total) by mouth 3 (three) times a day as needed for cough   fluticasone (FLONASE) 50 mcg/act nasal spray   No No   Si sprays into each nostril 2 (two) times a day   montelukast (SINGULAIR) 10 mg tablet   No No   Sig: TAKE 1 TABLET(10 MG) BY MOUTH DAILY AT BEDTIME   omeprazole (PriLOSEC) 40 MG capsule   No No   Sig: Take 1 capsule (40 mg total) by mouth daily   predniSONE 20 mg tablet   No No   Sig: Take 2 tablets (40 mg total) by mouth daily   sodium chloride (OCEAN) 0.65 % nasal spray   No No   Si spray into each nostril as needed for congestion or rhinitis      Facility-Administered Medications: None       Past Medical History:   Diagnosis Date    Anxiety     Asthma     Gastritis        Past Surgical History:   Procedure Laterality Date    ENDOMETRIAL ABLATION W/ Rosemarie Lagos         Family History   Problem Relation Age of Onset    Cancer Mother     No Known Problems Father      I have reviewed and agree with the history as documented. E-Cigarette/Vaping    E-Cigarette Use Never User      E-Cigarette/Vaping Substances     Social History     Tobacco Use    Smoking status: Every Day     Packs/day: 0.25     Types: Cigarettes     Passive exposure: Current    Smokeless tobacco: Never   Vaping Use    Vaping Use: Never used   Substance Use Topics    Alcohol use: Yes     Comment: socially    Drug use: No       Review of Systems   Constitutional:  Positive for fatigue. Negative for chills and fever. HENT:  Positive for congestion. Negative for ear pain and sore throat. Eyes:  Negative for pain and visual disturbance. Respiratory:  Positive for cough, chest tightness and shortness of breath. Cardiovascular:  Negative for chest pain and palpitations. Gastrointestinal:  Negative for abdominal pain and vomiting. Genitourinary:  Negative for dysuria and hematuria. Musculoskeletal:  Negative for arthralgias and back pain. Skin:  Negative for color change and rash. Neurological:  Positive for weakness. Negative for seizures and syncope. All other systems reviewed and are negative. Physical Exam  Physical Exam  Vitals and nursing note reviewed. Constitutional:       General: She is not in acute distress. Appearance: Normal appearance. She is well-developed and normal weight. HENT:      Head: Normocephalic and atraumatic. Right Ear: Tympanic membrane, ear canal and external ear normal.      Left Ear: Tympanic membrane, ear canal and external ear normal.      Mouth/Throat:      Mouth: Mucous membranes are moist.   Eyes:      Extraocular Movements: Extraocular movements intact. Conjunctiva/sclera: Conjunctivae normal.      Pupils: Pupils are equal, round, and reactive to light.    Cardiovascular:      Rate and Rhythm: Normal rate and regular rhythm. Pulses: Normal pulses. Heart sounds: Normal heart sounds. No murmur heard. Pulmonary:      Effort: Pulmonary effort is normal. No respiratory distress. Breath sounds: Normal breath sounds. Comments: ONLY SOME WHEEZING WITH FORCED EXPIRATION   Abdominal:      Palpations: Abdomen is soft. Tenderness: There is no abdominal tenderness. Musculoskeletal:         General: No swelling. Cervical back: Neck supple. Skin:     General: Skin is warm and dry. Capillary Refill: Capillary refill takes less than 2 seconds. Neurological:      Mental Status: She is alert. Psychiatric:         Mood and Affect: Mood normal.         Thought Content: Thought content normal.         Judgment: Judgment normal.         Vital Signs  ED Triage Vitals [12/10/23 1347]   Temperature Pulse Respirations Blood Pressure SpO2   98.2 °F (36.8 °C) 100 20 169/72 97 %      Temp Source Heart Rate Source Patient Position - Orthostatic VS BP Location FiO2 (%)   Oral Monitor Sitting Right arm --      Pain Score       No Pain           Vitals:    12/10/23 1347   BP: 169/72   Pulse: 100   Patient Position - Orthostatic VS: Sitting         Visual Acuity      ED Medications  Medications   benzonatate (TESSALON PERLES) capsule 200 mg (200 mg Oral Given 12/10/23 1524)       Diagnostic Studies  Results Reviewed       None                   XR chest 2 views   ED Interpretation by Susanna Vega MD (12/10 1613)   Radiology studies have been independently visualized by me. Preliminary interpretation: no ptx, no pleural effusion, normal heart size, no infiltrate or suspicious masses  All radiology studies will be officially read by the radiologist and any discrepancies flagged and follow through the discrepancy management process to make the patient aware of significant results.                      Procedures  Procedures         ED Course                               SBIRT 20yo+ Flowsheet Row Most Recent Value   Initial Alcohol Screen: US AUDIT-C     1. How often do you have a drink containing alcohol? 0 Filed at: 12/10/2023 1547   2. How many drinks containing alcohol do you have on a typical day you are drinking? 0 Filed at: 12/10/2023 1547   3b. FEMALE Any Age, or MALE 65+: How often do you have 4 or more drinks on one occassion? 0 Filed at: 12/10/2023 1547   Audit-C Score 0 Filed at: 12/10/2023 1547   BRODIE: How many times in the past year have you. .. Used an illegal drug or used a prescription medication for non-medical reasons? Never Filed at: 12/10/2023 1547                      Medical Decision Making  Problems Addressed:  Acute cough: chronic illness or injury with exacerbation, progression, or side effects of treatment  Bronchitis: acute illness or injury    Amount and/or Complexity of Data Reviewed  Radiology: ordered and independent interpretation performed. Discussion of management or test interpretation with external provider(s): Patient clinical presentation is benign. Meaning patient's vital signs are normal and stable ED Triage Vitals [12/10/23 1347]  Temperature: 98.2 °F (36.8 °C)  Pulse: 100  Respirations: 20  Blood Pressure: 169/72  SpO2: 97 %  Temp Source: Oral  Heart Rate Source: Monitor  Patient Position - Orthostatic VS: Sitting  BP Location: Right arm  FiO2 (%): n/a  Pain Score: No Pain. Patient in no distress. Chief complaint, vital signs, physical examination does not suggest an acute medical emergency at this time. Risk  Prescription drug management.              Disposition  Final diagnoses:   Acute cough   Bronchitis     Time reflects when diagnosis was documented in both MDM as applicable and the Disposition within this note       Time User Action Codes Description Comment    12/10/2023  4:13 PM Bobby Mcmahan [R05.1] Acute cough     12/10/2023  4:13 PM Venita Mcmahan Bronchitis           ED Disposition       ED Disposition Discharge    Condition   Stable    Date/Time   Sun Dec 10, 2023 [address] [place] [address] [address] discharge to home/self care. Follow-up Information       Follow up With Specialties Details Why [address] [facility] [place], [address] [facility], Nurse Practitioner In 3 days If symptoms worsen [address]  [address]  [phone]              Discharge Medication List as of 12/10/2023  4:15 PM        START taking these medications    Details   !! benzonatate (TESSALON PERLES) 100 mg capsule Take 2 capsules (200 mg total) by mouth every 8 (eight) hours, Starting Sun 12/10/2023, Normal      levofloxacin (LEVAQUIN) 500 mg tablet Take 1 tablet (500 mg total) by mouth daily for 7 days, Starting Sun 12/10/2023, Until Sun 12/17/2023, Normal      !! predniSONE 10 mg tablet Multiple Dosages:Starting Sun 12/10/2023, Until Tue 12/12/2023 at 2359, THEN Starting Wed 12/13/2023, Until Fri 12/15/2023 at 2359, THEN Starting Sat 12/16/2023, Until Mon 12/18/2023 at 2359, THEN Starting Tue 12/19/2023, Until Thu 12/21/2023 at 2359 , THEN Starting Fri 12/22/2023, Until Sun 12/24/2023 at 2359Take 5 tablets (50 mg total) by mouth daily for 3 days, THEN 4 tablets (40 mg total) daily for 3 days, THEN 3 tablets (30 mg total) daily for 3 days, THEN 2 tablets (20 mg total) daily for 3 d ays, THEN 1 tablet (10 mg total) daily for 3 days. , Normal       !! - Potential duplicate medications found. Please discuss with provider.         CONTINUE these medications which have NOT CHANGED    Details   albuterol (PROVENTIL HFA,VENTOLIN HFA) 90 mcg/act inhaler Inhale 2 puffs every 6 (six) hours as needed for wheezing, Starting Thu 6/22/2023, Normal      amoxicillin-clavulanate (AUGMENTIN) 875-125 mg per tablet Take 1 tablet by mouth every 12 (twelve) hours for 7 days, Starting Mon 12/4/2023, Until Mon 12/11/2023, Normal      azelastine (ASTELIN) 0.1 % nasal spray 1 spray into each nostril 2 (two) times a day Use in each nostril as directed, Starting Thu 10/26/2023, Normal      !! benzonatate (TESSALON PERLES) 100 mg capsule Take 1 capsule (100 mg total) by mouth 3 (three) times a day as needed for cough, Starting Mon 10/30/2023, Normal      fluticasone (FLONASE) 50 mcg/act nasal spray 2 sprays into each nostril 2 (two) times a day, Starting Mon 5/1/2023, Normal      montelukast (SINGULAIR) 10 mg tablet TAKE 1 TABLET(10 MG) BY MOUTH DAILY AT BEDTIME, Starting Fri 7/28/2023, Normal      omeprazole (PriLOSEC) 40 MG capsule Take 1 capsule (40 mg total) by mouth daily, Starting Mon 10/2/2023, Normal      !! predniSONE 20 mg tablet Take 2 tablets (40 mg total) by mouth daily, Starting Fri 12/8/2023, Normal      sodium chloride (OCEAN) 0.65 % nasal spray 1 spray into each nostril as needed for congestion or rhinitis, Starting u 10/26/2023, Normal       !! - Potential duplicate medications found. Please discuss with provider. No discharge procedures on file.     PDMP Review       None            ED Provider  Electronically Signed by             Susanna Vega MD  12/10/23 2034

## 2023-12-10 NOTE — ED PROVIDER NOTES
History  Chief Complaint   Patient presents with    Cough     Pt reports cough and congestion, seen at urgent care, dx with sinus infection. Reports hx asthma. She is a pleasant 49-year-old female here for evaluation of cough, wheezing, and mild dyspnea on exertion. She reports that she has had URI symptoms of nasal congestion and sinus pressure, cough, mild sore throat for several days now. She was started on Augmentin by another provider for presumed sinusitis. States she has been taking the medication for about 3 days now. She feels that she is having a flare of her asthma. Describes wheezing and dry cough without overt chest pain or fever. Cough  Associated symptoms: no chest pain, no chills, no fever, no rash, no shortness of breath and no sore throat        Prior to Admission Medications   Prescriptions Last Dose Informant Patient Reported? Taking?    albuterol (PROVENTIL HFA,VENTOLIN HFA) 90 mcg/act inhaler   No No   Sig: Inhale 2 puffs every 6 (six) hours as needed for wheezing   amoxicillin-clavulanate (AUGMENTIN) 875-125 mg per tablet   No No   Sig: Take 1 tablet by mouth every 12 (twelve) hours for 7 days   azelastine (ASTELIN) 0.1 % nasal spray   No No   Si spray into each nostril 2 (two) times a day Use in each nostril as directed   benzonatate (TESSALON PERLES) 100 mg capsule   No No   Sig: Take 1 capsule (100 mg total) by mouth 3 (three) times a day as needed for cough   fluticasone (FLONASE) 50 mcg/act nasal spray   No No   Si sprays into each nostril 2 (two) times a day   montelukast (SINGULAIR) 10 mg tablet   No No   Sig: TAKE 1 TABLET(10 MG) BY MOUTH DAILY AT BEDTIME   omeprazole (PriLOSEC) 40 MG capsule   No No   Sig: Take 1 capsule (40 mg total) by mouth daily   sodium chloride (OCEAN) 0.65 % nasal spray   No No   Si spray into each nostril as needed for congestion or rhinitis      Facility-Administered Medications: None       Past Medical History:   Diagnosis Date Anxiety     Asthma     Gastritis        Past Surgical History:   Procedure Laterality Date    ENDOMETRIAL ABLATION W/ Leo Penny         Family History   Problem Relation Age of Onset    Cancer Mother     No Known Problems Father      I have reviewed and agree with the history as documented. E-Cigarette/Vaping    E-Cigarette Use Never User      E-Cigarette/Vaping Substances     Social History     Tobacco Use    Smoking status: Every Day     Packs/day: 0.25     Types: Cigarettes     Passive exposure: Current    Smokeless tobacco: Never   Vaping Use    Vaping Use: Never used   Substance Use Topics    Alcohol use: Yes     Comment: socially    Drug use: No       Review of Systems   Constitutional:  Negative for chills and fever. HENT:  Negative for sore throat. Eyes:  Negative for visual disturbance. Respiratory:  Positive for cough. Negative for shortness of breath. Cardiovascular:  Negative for chest pain and palpitations. Gastrointestinal:  Negative for abdominal pain and vomiting. Genitourinary:  Negative for dysuria and hematuria. Musculoskeletal:  Negative for arthralgias and back pain. Skin:  Negative for color change and rash. Neurological:  Negative for syncope. All other systems reviewed and are negative. Physical Exam  Physical Exam  Vitals and nursing note reviewed. Constitutional:       General: She is not in acute distress. Appearance: She is well-developed. HENT:      Head: Normocephalic and atraumatic. Mouth/Throat:      Mouth: Mucous membranes are moist.   Eyes:      Conjunctiva/sclera: Conjunctivae normal.   Cardiovascular:      Rate and Rhythm: Normal rate and regular rhythm. Heart sounds: No murmur heard. Pulmonary:      Effort: Pulmonary effort is normal. No respiratory distress. Breath sounds: Wheezing present.       Comments: Mild scattered wheezes, no significant tachypnea, conversational dyspnea, accessory muscle use, or other signs of acute respiratory distress. Abdominal:      Palpations: Abdomen is soft. Tenderness: There is no abdominal tenderness. Musculoskeletal:         General: No swelling. Cervical back: Neck supple. Skin:     General: Skin is warm and dry. Capillary Refill: Capillary refill takes less than 2 seconds. Neurological:      General: No focal deficit present. Mental Status: She is alert and oriented to person, place, and time. Psychiatric:         Mood and Affect: Mood normal.         Vital Signs  ED Triage Vitals   Temperature Pulse Respirations Blood Pressure SpO2   12/08/23 1014 12/08/23 1014 12/08/23 1014 12/08/23 1014 12/08/23 1014   98.2 °F (36.8 °C) (!) 109 22 162/79 97 %      Temp Source Heart Rate Source Patient Position - Orthostatic VS BP Location FiO2 (%)   12/08/23 1014 12/08/23 1014 12/08/23 1145 12/08/23 1145 --   Oral Monitor Sitting Right arm       Pain Score       12/08/23 1355       2           Vitals:    12/08/23 1014 12/08/23 1145 12/08/23 1355   BP: 162/79 161/77 168/78   Pulse: (!) 109 (!) 109 102   Patient Position - Orthostatic VS:  Sitting Sitting         Visual Acuity      ED Medications  Medications   albuterol inhalation solution 5 mg (5 mg Nebulization Given 12/8/23 1227)   ipratropium (ATROVENT) 0.02 % inhalation solution 0.5 mg (0.5 mg Nebulization Given 12/8/23 1228)   predniSONE tablet 40 mg (40 mg Oral Given 12/8/23 1227)       Diagnostic Studies  Results Reviewed       None                   No orders to display              Procedures  Procedures         ED Course                               SBIRT 22yo+      Flowsheet Row Most Recent Value   Initial Alcohol Screen: US AUDIT-C     1. How often do you have a drink containing alcohol? 0 Filed at: 12/08/2023 1228   2. How many drinks containing alcohol do you have on a typical day you are drinking? 0 Filed at: 12/08/2023 1228   3b. FEMALE Any Age, or MALE 65+:  How often do you have 4 or more drinks on one occassion? 0 Filed at: 12/08/2023 1228   Audit-C Score 0 Filed at: 12/08/2023 1228   BRODIE: How many times in the past year have you. .. Used an illegal drug or used a prescription medication for non-medical reasons? Never Filed at: 12/08/2023 1228                      Medical Decision Making  80-year-old female with URI symptoms for 4 days or so. Already taking Augmentin as prescribed by another provider. Does have some wheezes and felt significant improvement after DuoNeb. Will start prednisone, discharged to follow-up with PCP. Discussed return precautions. Risk  Prescription drug management. Disposition  Final diagnoses:   Viral URI with cough   Sinusitis     Time reflects when diagnosis was documented in both MDM as applicable and the Disposition within this note       Time User Action Codes Description Comment    12/8/2023  2:02 PM Pati CHING Add [J06.9] Viral URI with cough     12/8/2023  2:04 PM Mari Palafox Add [J32.9] Sinusitis           ED Disposition       ED Disposition   Discharge    Condition   Stable    Date/Time   Fri Dec 8, 2023 [address] [facility] [address] discharge to home/self care.                    Follow-up Information       Follow up With Specialties Details Why [address] [facility] [place], [address] [facility], Nurse Practitioner   [address]  [phone]              Discharge Medication List as of 12/8/2023  2:58 PM        START taking these medications    Details   predniSONE 20 mg tablet Take 2 tablets (40 mg total) by mouth daily, Starting Fri 12/8/2023, Normal           CONTINUE these medications which have NOT CHANGED    Details   albuterol (PROVENTIL HFA,VENTOLIN HFA) 90 mcg/act inhaler Inhale 2 puffs every 6 (six) hours as needed for wheezing, Starting Thu 6/22/2023, Normal      amoxicillin-clavulanate (AUGMENTIN) 875-125 mg per tablet Take 1 tablet by mouth every 12 (twelve) hours for 7 days, Starting Mon 12/4/2023, Until Mon 12/11/2023, Normal      azelastine (ASTELIN) 0.1 % nasal spray 1 spray into each nostril 2 (two) times a day Use in each nostril as directed, Starting Thu 10/26/2023, Normal      benzonatate (TESSALON PERLES) 100 mg capsule Take 1 capsule (100 mg total) by mouth 3 (three) times a day as needed for cough, Starting Mon 10/30/2023, Normal      fluticasone (FLONASE) 50 mcg/act nasal spray 2 sprays into each nostril 2 (two) times a day, Starting Mon 5/1/2023, Normal      montelukast (SINGULAIR) 10 mg tablet TAKE 1 TABLET(10 MG) BY MOUTH DAILY AT BEDTIME, Starting Fri 7/28/2023, Normal      omeprazole (PriLOSEC) 40 MG capsule Take 1 capsule (40 mg total) by mouth daily, Starting Mon 10/2/2023, Normal      sodium chloride (OCEAN) 0.65 % nasal spray 1 spray into each nostril as needed for congestion or rhinitis, Starting Thu 10/26/2023, Normal             No discharge procedures on file.     PDMP Review       None            ED Provider  Electronically Signed by             Charline Patterson MD  12/10/23 0530

## 2023-12-13 ENCOUNTER — OFFICE VISIT (OUTPATIENT)
Dept: FAMILY MEDICINE CLINIC | Facility: CLINIC | Age: 53
End: 2023-12-13

## 2023-12-13 VITALS
HEIGHT: 65 IN | OXYGEN SATURATION: 97 % | RESPIRATION RATE: 18 BRPM | BODY MASS INDEX: 28.49 KG/M2 | TEMPERATURE: 98.8 F | DIASTOLIC BLOOD PRESSURE: 88 MMHG | SYSTOLIC BLOOD PRESSURE: 148 MMHG | HEART RATE: 104 BPM | WEIGHT: 171 LBS

## 2023-12-13 DIAGNOSIS — R03.0 ELEVATED BLOOD PRESSURE READING: ICD-10-CM

## 2023-12-13 DIAGNOSIS — J30.9 ALLERGIC RHINITIS, UNSPECIFIED SEASONALITY, UNSPECIFIED TRIGGER: ICD-10-CM

## 2023-12-13 DIAGNOSIS — J32.9 CHRONIC SINUSITIS, UNSPECIFIED LOCATION: ICD-10-CM

## 2023-12-13 DIAGNOSIS — K29.50 CHRONIC GASTRITIS WITHOUT BLEEDING, UNSPECIFIED GASTRITIS TYPE: ICD-10-CM

## 2023-12-13 DIAGNOSIS — J45.40 MODERATE PERSISTENT ASTHMA WITHOUT COMPLICATION: Primary | ICD-10-CM

## 2023-12-13 DIAGNOSIS — J45.41 MODERATE PERSISTENT ASTHMA WITH EXACERBATION: ICD-10-CM

## 2023-12-13 PROCEDURE — 94640 AIRWAY INHALATION TREATMENT: CPT | Performed by: NURSE PRACTITIONER

## 2023-12-13 PROCEDURE — 99214 OFFICE O/P EST MOD 30 MIN: CPT | Performed by: NURSE PRACTITIONER

## 2023-12-13 RX ORDER — IPRATROPIUM BROMIDE AND ALBUTEROL SULFATE 2.5; .5 MG/3ML; MG/3ML
3 SOLUTION RESPIRATORY (INHALATION) ONCE
Status: COMPLETED | OUTPATIENT
Start: 2023-12-13 | End: 2023-12-13

## 2023-12-13 RX ORDER — METHYLPREDNISOLONE SODIUM SUCCINATE 40 MG/ML
40 INJECTION, POWDER, LYOPHILIZED, FOR SOLUTION INTRAMUSCULAR; INTRAVENOUS ONCE
Status: COMPLETED | OUTPATIENT
Start: 2023-12-13 | End: 2023-12-13

## 2023-12-13 RX ORDER — PREDNISONE 10 MG/1
TABLET ORAL DAILY
Qty: 45 TABLET | Refills: 0 | Status: SHIPPED | OUTPATIENT
Start: 2023-12-13 | End: 2023-12-14

## 2023-12-13 RX ORDER — FLUTICASONE PROPIONATE 50 MCG
2 SPRAY, SUSPENSION (ML) NASAL 2 TIMES DAILY
Qty: 16 G | Refills: 2 | Status: SHIPPED | OUTPATIENT
Start: 2023-12-13

## 2023-12-13 RX ORDER — MONTELUKAST SODIUM 10 MG/1
10 TABLET ORAL
Qty: 90 TABLET | Refills: 0 | Status: SHIPPED | OUTPATIENT
Start: 2023-12-13

## 2023-12-13 RX ORDER — OMEPRAZOLE 40 MG/1
40 CAPSULE, DELAYED RELEASE ORAL DAILY
Qty: 90 CAPSULE | Refills: 0 | Status: SHIPPED | OUTPATIENT
Start: 2023-12-13

## 2023-12-13 RX ORDER — ALBUTEROL SULFATE 90 UG/1
2 AEROSOL, METERED RESPIRATORY (INHALATION) EVERY 6 HOURS PRN
Qty: 96 G | Refills: 5 | Status: SHIPPED | OUTPATIENT
Start: 2023-12-13

## 2023-12-13 RX ORDER — METHYLPREDNISOLONE SODIUM SUCCINATE 40 MG/ML
40 INJECTION, POWDER, LYOPHILIZED, FOR SOLUTION INTRAMUSCULAR; INTRAVENOUS ONCE
Status: DISCONTINUED | OUTPATIENT
Start: 2023-12-13 | End: 2023-12-13

## 2023-12-13 RX ADMIN — IPRATROPIUM BROMIDE AND ALBUTEROL SULFATE 3 ML: 2.5; .5 SOLUTION RESPIRATORY (INHALATION) at 16:11

## 2023-12-13 RX ADMIN — METHYLPREDNISOLONE SODIUM SUCCINATE 40 MG: 40 INJECTION, POWDER, LYOPHILIZED, FOR SOLUTION INTRAMUSCULAR; INTRAVENOUS at 16:10

## 2023-12-13 NOTE — PROGRESS NOTES
Name: Mukesh Nuñez      : 1970      MRN: [id number]  Encounter Provider: TERESA Amado  Encounter Date: 2023   Encounter department: [address] [facility]     1. Moderate persistent asthma without complication  Assessment & Plan:  Current exacerbation   Frequent exacerbations, using albuterol multiple times per day   Recommend start Advair bid, Singulair HS, continue Albuterol pump prn   Will send for nebulizer       Orders:  -     ipratropium-albuterol (DUO-NEB) 0.5-2.5 mg/3 mL nebulizer solution; Take 3 mL by nebulization 4 (four) times a day    2. Chronic sinusitis, unspecified location  Assessment & Plan:  Chronic issue recurring maxillary and frontal sinusitis x >1 year   She will likely need referral to ENT   Sinus x-ray ordered     Orders:  -     XR sinuses < 3 vw; Future; Expected date: 2023    3. Moderate persistent asthma with exacerbation  -     ipratropium-albuterol (DUO-NEB) 0.5-2.5 mg/3 mL inhalation solution 3 mL  -     albuterol (PROVENTIL HFA,VENTOLIN HFA) 90 mcg/act inhaler; Inhale 2 puffs every 6 (six) hours as needed for wheezing  -     montelukast (SINGULAIR) 10 mg tablet; Take 1 tablet (10 mg total) by mouth daily at bedtime  -     methylPREDNISolone sodium succinate (Solu-MEDROL) injection 40 mg    4. Allergic rhinitis, unspecified seasonality, unspecified trigger  -     fluticasone (FLONASE) 50 mcg/act nasal spray; 2 sprays into each nostril 2 (two) times a day    5. Chronic gastritis without bleeding, unspecified gastritis type  -     omeprazole (PriLOSEC) 40 MG capsule; Take 1 capsule (40 mg total) by mouth daily    6.  Elevated blood pressure reading  Assessment & Plan:  BP Readings from Last 3 Encounters:   23 148/88   12/10/23 169/72   23 168/78      Patient's BP elevated on last several encounters   On discussion she has been taking decongestant medication consistently   Advised her to stop taking, will recheck in the office in a few weeks            Subjective       Lucinda Awad is a 48 y.o. female who  has a past medical history of Anxiety, Asthma, and Gastritis. who presented to the office today for follow up. Has been seen in the ED and Urgent Care multiple times over last several weeks due to sinusitis and asthma exacerbations. She has completed Augmentin and steroid tapers w/o improvement of sx. She states that she is having sinus pressure and pain, nasal congestion, unable to breathe through her nose, cough with mucus she is not able to bring up but makes her choke. The following portions of the patient's history were reviewed and updated as appropriate: allergies, current medications, past family history, past medical history, past social history, past surgical history and problem list.        Review of Systems   Constitutional:  Negative for chills and fever. HENT:  Positive for congestion, postnasal drip, sinus pressure and sinus pain. Negative for ear pain and sore throat. Eyes:  Negative for pain and visual disturbance. Respiratory:  Positive for cough. Negative for shortness of breath. Cardiovascular:  Negative for chest pain and palpitations. Gastrointestinal:  Negative for abdominal pain and vomiting. Genitourinary:  Negative for dysuria and hematuria. Musculoskeletal:  Negative for arthralgias and back pain. Skin:  Negative for color change and rash. Neurological:  Negative for seizures and syncope. All other systems reviewed and are negative.       Past Medical History:   Diagnosis Date   • Anxiety    • Asthma    • Gastritis      Past Surgical History:   Procedure Laterality Date   • ENDOMETRIAL ABLATION W/ Meme [place]       Family History   Problem Relation Age of Onset   • Cancer Mother    • No Known Problems Father      Social History     Socioeconomic History   • Marital status: Single     Spouse name: None   • Number of children: None   • Years of education: None   • Highest education level: None   Occupational History   • None   Tobacco Use   • Smoking status: Every Day     Current packs/day: 0.25     Types: Cigarettes     Passive exposure: Current   • Smokeless tobacco: Never   Vaping Use   • Vaping status: Never Used   Substance and Sexual Activity   • Alcohol use: Yes     Comment: socially   • Drug use: No   • Sexual activity: None   Other Topics Concern   • None   Social History Narrative   • None     Social Determinants of Health     Financial Resource Strain: Low Risk  (8/18/2023)    Overall Financial Resource Strain (CARDIA)    • Difficulty of Paying Living Expenses: Not very hard   Food Insecurity: No Food Insecurity (8/18/2023)    Hunger Vital Sign    • Worried About Running Out of Food in the Last Year: Never true    • Ran Out of Food in the Last Year: Never true   Transportation Needs: No Transportation Needs (8/18/2023)    PRAPARE - Transportation    • Lack of Transportation (Medical): No    • Lack of Transportation (Non-Medical):  No   Physical Activity: Not on file   Stress: Not on file   Social Connections: Not on file   Intimate Partner Violence: Not on file   Housing Stability: Not on file     Current Outpatient Medications on File Prior to Visit   Medication Sig   • benzonatate (TESSALON PERLES) 100 mg capsule Take 1 capsule (100 mg total) by mouth 3 (three) times a day as needed for cough   • levofloxacin (LEVAQUIN) 500 mg tablet Take 1 tablet (500 mg total) by mouth daily for 7 days   • predniSONE 20 mg tablet Take 2 tablets (40 mg total) by mouth daily   • [DISCONTINUED] benzonatate (TESSALON PERLES) 100 mg capsule Take 2 capsules (200 mg total) by mouth every 8 (eight) hours   • [DISCONTINUED] predniSONE 10 mg tablet Take 5 tablets (50 mg total) by mouth daily for 3 days, THEN 4 tablets (40 mg total) daily for 3 days, THEN 3 tablets (30 mg total) daily for 3 days, THEN 2 tablets (20 mg total) daily for 3 days, THEN 1 tablet (10 mg total) daily for 3 days. • [DISCONTINUED] sodium chloride (OCEAN) 0.65 % nasal spray 1 spray into each nostril as needed for congestion or rhinitis     Allergies   Allergen Reactions   • Z-Galdino [Azithromycin] Rash   • Other      Shellfish        Immunization History   Administered Date(s) Administered   • Tuberculin Skin Test-PPD Intradermal 02/08/2019, 02/25/2019       Objective     /88 (BP Location: Right arm, Patient Position: Sitting, Cuff Size: Standard)   Pulse 104   Temp 98.8 °F (37.1 °C) (Temporal)   Resp 18   Ht 5' 4.5" (1.638 m)   Wt 77.6 kg (171 lb)   LMP  (LMP Unknown)   SpO2 97%   BMI 28.90 kg/m²     Physical Exam  Vitals and nursing note reviewed. Constitutional:       General: She is not in acute distress. Appearance: She is well-developed. She is not diaphoretic. HENT:      Head: Normocephalic and atraumatic. Right Ear: External ear normal.      Left Ear: External ear normal.      Nose:      Right Sinus: Maxillary sinus tenderness and frontal sinus tenderness present. Left Sinus: Maxillary sinus tenderness and frontal sinus tenderness present. Mouth/Throat:      Pharynx: Oropharyngeal exudate and posterior oropharyngeal erythema present. Eyes:      Conjunctiva/sclera: Conjunctivae normal.      Pupils: Pupils are equal, round, and reactive to light. Cardiovascular:      Rate and Rhythm: Normal rate and regular rhythm. Pulmonary:      Effort: Pulmonary effort is normal. No respiratory distress. Breath sounds: Wheezing present. Abdominal:      General: Bowel sounds are normal. There is no distension. Palpations: Abdomen is soft. Tenderness: There is no abdominal tenderness. There is no guarding or rebound. Musculoskeletal:         General: No deformity. Normal range of motion. Cervical back: Normal range of motion and neck supple. Lymphadenopathy:      Cervical: Cervical adenopathy present. Skin:     General: Skin is warm and dry. Capillary Refill: Capillary refill takes less than 2 seconds. Findings: No rash. Neurological:      Mental Status: She is alert and oriented to person, place, and time. Sensory: No sensory deficit.       Coordination: Coordination normal.      Deep Tendon Reflexes: Reflexes normal.   Psychiatric:         Behavior: Behavior normal.       Kimi Locke

## 2023-12-14 PROBLEM — J45.40 MODERATE PERSISTENT ASTHMA WITHOUT COMPLICATION: Status: ACTIVE | Noted: 2018-04-13

## 2023-12-14 PROBLEM — J01.11 ACUTE RECURRENT FRONTAL SINUSITIS: Status: RESOLVED | Noted: 2023-10-26 | Resolved: 2023-12-14

## 2023-12-14 PROBLEM — Z59.89 DOES NOT HAVE HEALTH INSURANCE: Status: RESOLVED | Noted: 2020-11-09 | Resolved: 2023-12-14

## 2023-12-14 PROBLEM — Z59.71 DOES NOT HAVE HEALTH INSURANCE: Status: RESOLVED | Noted: 2020-11-09 | Resolved: 2023-12-14

## 2023-12-14 PROBLEM — R03.0 ELEVATED BLOOD PRESSURE READING: Status: ACTIVE | Noted: 2023-12-14

## 2023-12-14 PROBLEM — Z00.00 HEALTHCARE MAINTENANCE: Status: RESOLVED | Noted: 2020-11-09 | Resolved: 2023-12-14

## 2023-12-14 RX ORDER — IPRATROPIUM BROMIDE AND ALBUTEROL SULFATE 2.5; .5 MG/3ML; MG/3ML
3 SOLUTION RESPIRATORY (INHALATION) 4 TIMES DAILY
Qty: 300 ML | Refills: 0 | Status: SHIPPED | OUTPATIENT
Start: 2023-12-14 | End: 2023-12-18

## 2023-12-14 NOTE — ASSESSMENT & PLAN NOTE
Chronic issue recurring maxillary and frontal sinusitis x >1 year   She will likely need referral to ENT   Sinus x-ray ordered

## 2023-12-14 NOTE — ASSESSMENT & PLAN NOTE
BP Readings from Last 3 Encounters:   12/13/23 148/88   12/10/23 169/72   12/08/23 168/78      Patient's BP elevated on last several encounters   On discussion she has been taking decongestant medication consistently   Advised her to stop taking, will recheck in the office in a few weeks

## 2023-12-14 NOTE — ASSESSMENT & PLAN NOTE
Current exacerbation   Frequent exacerbations, using albuterol multiple times per day   Recommend start Advair bid, Singulair HS, continue Albuterol pump prn   Will send for nebulizer

## 2023-12-18 DIAGNOSIS — J45.40 MODERATE PERSISTENT ASTHMA WITHOUT COMPLICATION: ICD-10-CM

## 2023-12-18 RX ORDER — IPRATROPIUM BROMIDE AND ALBUTEROL SULFATE 2.5; .5 MG/3ML; MG/3ML
SOLUTION RESPIRATORY (INHALATION)
Qty: 270 ML | Refills: 2 | Status: SHIPPED | OUTPATIENT
Start: 2023-12-18 | End: 2023-12-29

## 2023-12-22 LAB
DME PARACHUTE DELIVERY DATE REQUESTED: NORMAL
DME PARACHUTE ITEM DESCRIPTION: NORMAL
DME PARACHUTE ORDER STATUS: NORMAL
DME PARACHUTE SUPPLIER NAME: NORMAL
DME PARACHUTE SUPPLIER PHONE: NORMAL

## 2023-12-26 LAB
DME PARACHUTE DELIVERY DATE ACTUAL: NORMAL
DME PARACHUTE DELIVERY DATE REQUESTED: NORMAL
DME PARACHUTE ITEM DESCRIPTION: NORMAL
DME PARACHUTE ORDER STATUS: NORMAL
DME PARACHUTE SUPPLIER NAME: NORMAL
DME PARACHUTE SUPPLIER PHONE: NORMAL

## 2023-12-29 DIAGNOSIS — J45.40 MODERATE PERSISTENT ASTHMA WITHOUT COMPLICATION: ICD-10-CM

## 2023-12-29 RX ORDER — IPRATROPIUM BROMIDE AND ALBUTEROL SULFATE 2.5; .5 MG/3ML; MG/3ML
SOLUTION RESPIRATORY (INHALATION)
Qty: 270 ML | Refills: 2 | Status: SHIPPED | OUTPATIENT
Start: 2023-12-29 | End: 2024-01-02

## 2024-01-01 DIAGNOSIS — J45.40 MODERATE PERSISTENT ASTHMA WITHOUT COMPLICATION: ICD-10-CM

## 2024-01-02 RX ORDER — IPRATROPIUM BROMIDE AND ALBUTEROL SULFATE 2.5; .5 MG/3ML; MG/3ML
SOLUTION RESPIRATORY (INHALATION)
Qty: 270 ML | Refills: 2 | Status: SHIPPED | OUTPATIENT
Start: 2024-01-02

## 2024-02-05 ENCOUNTER — OFFICE VISIT (OUTPATIENT)
Dept: FAMILY MEDICINE CLINIC | Facility: CLINIC | Age: 54
End: 2024-02-05

## 2024-02-05 VITALS
WEIGHT: 176 LBS | HEIGHT: 64 IN | DIASTOLIC BLOOD PRESSURE: 70 MMHG | RESPIRATION RATE: 16 BRPM | HEART RATE: 85 BPM | TEMPERATURE: 98 F | BODY MASS INDEX: 30.05 KG/M2 | OXYGEN SATURATION: 95 % | SYSTOLIC BLOOD PRESSURE: 130 MMHG

## 2024-02-05 DIAGNOSIS — R03.0 ELEVATED BLOOD PRESSURE READING: ICD-10-CM

## 2024-02-05 DIAGNOSIS — J45.40 MODERATE PERSISTENT ASTHMA WITHOUT COMPLICATION: ICD-10-CM

## 2024-02-05 DIAGNOSIS — J06.9 VIRAL UPPER RESPIRATORY TRACT INFECTION: Primary | ICD-10-CM

## 2024-02-05 PROBLEM — H92.09 EARACHE: Status: ACTIVE | Noted: 2024-02-05

## 2024-02-05 PROCEDURE — 99213 OFFICE O/P EST LOW 20 MIN: CPT | Performed by: STUDENT IN AN ORGANIZED HEALTH CARE EDUCATION/TRAINING PROGRAM

## 2024-02-05 NOTE — ASSESSMENT & PLAN NOTE
BP Readings from Last 3 Encounters:   12/13/23 148/88   12/10/23 169/72   12/08/23 168/78     Previously elevated readings while on nasal decongestant. Today BP is 130/70 and therefore no treatment indicated at this time.

## 2024-02-05 NOTE — PATIENT INSTRUCTIONS
Pharyngitis   WHAT YOU NEED TO KNOW:   Pharyngitis, or sore throat, is inflammation of the tissues and structures in your pharynx (throat). Pharyngitis is most often caused by bacteria or a virus. Other causes include smoking, allergies, or acid reflux.  DISCHARGE INSTRUCTIONS:   Call your local emergency number (911 in the ) if:   You have trouble breathing or swallowing because your throat is swollen.      Return to the emergency department if:   You are drooling because it hurts too much to swallow.    Your fever is higher than 102?F (39?C) or lasts longer than 3 days.    You are confused.    You taste blood.    Call your doctor if:   Your throat pain gets worse.    You have a painful lump in your throat that does not go away after 5 days.    Your symptoms do not improve after 5 days.    You have questions or concerns about your condition or care.    Medicines:  Viral pharyngitis will go away on its own without treatment. Your sore throat should start to feel better in 3 to 5 days. You may need any of the following:  Antibiotics  treat a bacterial infection.    NSAIDs  help decrease swelling and pain or fever. This medicine is available with or without a doctor's order. NSAIDs can cause stomach bleeding or kidney problems in certain people. If you take blood thinner medicine, always ask your healthcare provider if NSAIDs are safe for you. Always read the medicine label and follow directions.    Acetaminophen  decreases pain and fever. It is available without a doctor's order. Ask how much to take and how often to take it. Follow directions. Read the labels of all other medicines you are using to see if they also contain acetaminophen, or ask your doctor or pharmacist. Acetaminophen can cause liver damage if not taken correctly.    Take your medicine as directed.  Contact your healthcare provider if you think your medicine is not helping or if you have side effects. Tell your provider if you are allergic to any  medicine. Keep a list of the medicines, vitamins, and herbs you take. Include the amounts, and when and why you take them. Bring the list or the pill bottles to follow-up visits. Carry your medicine list with you in case of an emergency.    Manage your symptoms:   Gargle salt water.  Mix ¼ teaspoon salt in an 8 ounce glass of warm water and gargle. Do not swallow. Salt water may help decrease swelling in your throat.    Drink liquids as directed.  You may need to drink more liquids than usual. Liquids may help soothe your throat and prevent dehydration. Ask how much liquid to drink each day and which liquids are best for you.    Use a cool mist humidifier.  This will add moisture to the air and help decrease your cough.    Soothe your throat.  Cough drops, ice, soft foods, or popsicles may help decrease throat pain.    Prevent the spread of pharyngitis:  Cover your mouth and nose when you cough or sneeze. Do not share food or drinks. Wash your hands often. Use soap and water. If soap and water are unavailable, use an alcohol-based hand .       Follow up with your doctor as directed:  Write down your questions so you remember to ask them during your visits.  © Copyright [employer] 2023 Information is for End User's use only and may not be sold, redistributed or otherwise used for commercial purposes.  The above information is an  only. It is not intended as medical advice for individual conditions or treatments. Talk to your doctor, nurse or pharmacist before following any medical regimen to see if it is safe and effective for you.

## 2024-02-05 NOTE — ASSESSMENT & PLAN NOTE
Likely viral given centor score of 1 with no exudates noted on examination. Patient not vaccinated for covid or flu.   - recommend supportive therapy with motrin for analgesia, adequate hydration and rest advised  - patient educated on no indication for antibiotics therapy however will share with PCP for follow up if deemed otherwise or symptoms worsen  - offered work note however not working today or tomorrow and will see how she feels to return on wed

## 2024-02-05 NOTE — PROGRESS NOTES
Name: Toshia Warren      : 1970      MRN: [id number]  Encounter Provider: Jordan Heard MD  Encounter Date: 2024   Encounter department: [facility]    Assessment & Plan     1. Viral upper respiratory tract infection  Assessment & Plan:  Likely viral given centor score of 1 with no exudates noted on examination. Patient not vaccinated for covid or flu.   - recommend supportive therapy with motrin for analgesia, adequate hydration and rest advised  - patient educated on no indication for antibiotics therapy however will share with PCP for follow up if deemed otherwise or symptoms worsen  - offered work note however not working today or tomorrow and will see how she feels to return on wed      2. Elevated blood pressure reading  Assessment & Plan:  BP Readings from Last 3 Encounters:   23 148/88   12/10/23 169/72   23 168/78     Previously elevated readings while on nasal decongestant. Today BP is 130/70 and therefore no treatment indicated at this time.        3. Moderate persistent asthma without complication  Assessment & Plan:  No sign of exacerbation at this time however in setting of URI recommended start daily TID/QID therapy with albuterol therapy x 2 weeks to minimize risk of exacerbation. I would recommend follow up with PCP for adjustment of therapy if truly moderate persistent asthma as patient currently managed on albuterol and duo nebs with good allergy therapy in singulair and flonase nasal spray.              Subjective      Toshia Warren is a very pleasant 53 y.o. female who has a PMH of Chronic gastritis, Moderate persistant asthma and lumbar herniated disc and presents today with complaints of left earache and tonsillar pain. Patient's chronic medical conditions are stable unless noted otherwise above. Patient reports compliance on medications prescribed with no side effects experienced at this time. This patient has had no  admissions to hospital or medical emergencies since the last visit to our office. Patient has no further complaints other than what is mentioned below and in the ROS.    Reports 4 days sore throat and pressure to left ear. Generalized myalgia and self reported exudate to left pharynx. No medications taken apart from her allegra chronic meds. Denies fever, cough, diarrhea or constipation.  No recent travel or ill contacts.  Patient asking for antibiotics as she reports recurrent history of similar symptoms that end up with her in the hospital with asthma exacerbation. I educated patient on no evidence for abx at this time but do agree that she should actually be proactive regarding therapy for her asthma in the setting of URI of any etiology (bacterial or viral). Patient recommended to take her albuterol inhaler 3-4 times daily for the next 2 weeks given no symptoms of cough, wheeze or SOB at this time to dictate improved symptoms on therapy.       Review of Systems   Constitutional:  Negative for chills and fever.   HENT:  Positive for ear pain and sore throat.    Eyes:  Negative for pain and visual disturbance.   Respiratory:  Negative for cough, shortness of breath and wheezing.    Cardiovascular:  Negative for chest pain and palpitations.   Gastrointestinal:  Negative for abdominal pain, constipation, diarrhea and vomiting.   Musculoskeletal:  Positive for myalgias. Negative for arthralgias and back pain.   Skin:  Negative for color change and rash.   All other systems reviewed and are negative.      Current Outpatient Medications on File Prior to Visit   Medication Sig    albuterol (PROVENTIL HFA,VENTOLIN HFA) 90 mcg/act inhaler Inhale 2 puffs every 6 (six) hours as needed for wheezing    benzonatate (TESSALON PERLES) 100 mg capsule Take 1 capsule (100 mg total) by mouth 3 (three) times a day as needed for cough    fluticasone (FLONASE) 50 mcg/act nasal spray 2 sprays into each nostril 2 (two) times a day     "ipratropium-albuterol (DUO-NEB) 0.5-2.5 mg/3 mL nebulizer solution USE 3 ML VIA NEBULIZER FOUR TIMES DAILY    montelukast (SINGULAIR) 10 mg tablet Take 1 tablet (10 mg total) by mouth daily at bedtime    omeprazole (PriLOSEC) 40 MG capsule Take 1 capsule (40 mg total) by mouth daily    predniSONE 20 mg tablet Take 2 tablets (40 mg total) by mouth daily       Objective     /70 (BP Location: Left arm, Patient Position: Sitting, Cuff Size: Large)   Pulse 85   Temp 98 °F (36.7 °C) (Temporal)   Resp 16   Ht 5' 4\" (1.626 m)   Wt 79.8 kg (176 lb)   LMP  (LMP Unknown)   SpO2 95%   BMI 30.21 kg/m²     Physical Exam  Vitals reviewed.   Constitutional:       General: She is not in acute distress.     Appearance: She is normal weight.   HENT:      Head: Atraumatic.      Right Ear: Tympanic membrane, ear canal and external ear normal.      Left Ear: Tympanic membrane, ear canal and external ear normal.      Nose: No congestion.      Left Sinus: Maxillary sinus tenderness present.      Mouth/Throat:      Mouth: Mucous membranes are moist.      Pharynx: Posterior oropharyngeal erythema present. No oropharyngeal exudate.   Eyes:      Conjunctiva/sclera: Conjunctivae normal.   Cardiovascular:      Rate and Rhythm: Normal rate and regular rhythm.      Pulses: Normal pulses.      Heart sounds: Normal heart sounds. No murmur heard.  Pulmonary:      Effort: Pulmonary effort is normal.      Breath sounds: Normal breath sounds. No wheezing.   Abdominal:      General: Abdomen is flat. Bowel sounds are normal. There is no distension.      Palpations: Abdomen is soft.      Tenderness: There is no abdominal tenderness.   Musculoskeletal:         General: Normal range of motion.      Cervical back: Normal range of motion.      Right lower leg: No edema.      Left lower leg: No edema.   Skin:     General: Skin is warm and dry.      Findings: No rash.   Neurological:      General: No focal deficit present.      Mental Status: She " is alert.   Psychiatric:         Behavior: Behavior normal.     Jordan Heard MD

## 2024-02-05 NOTE — Clinical Note
Bolivar Joyner, saw your patient today and she was disappointed to not receive abx. I did not think it indicated and gave usual education for supportive therapy in this suspected viral URI. Centor score 1. Just wanted to loop you in for reference and ability to  in case you deemed otherwise fitting. Thanks!

## 2024-02-05 NOTE — ASSESSMENT & PLAN NOTE
No sign of exacerbation at this time however in setting of URI recommended start daily TID/QID therapy with albuterol therapy x 2 weeks to minimize risk of exacerbation. I would recommend follow up with PCP for adjustment of therapy if truly moderate persistent asthma as patient currently managed on albuterol and duo nebs with good allergy therapy in singulair and flonase nasal spray.

## 2024-03-11 DIAGNOSIS — K29.50 CHRONIC GASTRITIS WITHOUT BLEEDING, UNSPECIFIED GASTRITIS TYPE: ICD-10-CM

## 2024-03-11 RX ORDER — OMEPRAZOLE 40 MG/1
40 CAPSULE, DELAYED RELEASE ORAL DAILY
Qty: 90 CAPSULE | Refills: 0 | Status: SHIPPED | OUTPATIENT
Start: 2024-03-11

## 2024-05-22 DIAGNOSIS — K29.50 CHRONIC GASTRITIS WITHOUT BLEEDING, UNSPECIFIED GASTRITIS TYPE: ICD-10-CM

## 2024-05-24 DIAGNOSIS — K29.50 CHRONIC GASTRITIS WITHOUT BLEEDING, UNSPECIFIED GASTRITIS TYPE: ICD-10-CM

## 2024-05-24 RX ORDER — OMEPRAZOLE 40 MG/1
40 CAPSULE, DELAYED RELEASE ORAL DAILY
Qty: 90 CAPSULE | Refills: 0 | Status: SHIPPED | OUTPATIENT
Start: 2024-05-24

## 2024-05-24 RX ORDER — OMEPRAZOLE 40 MG/1
40 CAPSULE, DELAYED RELEASE ORAL DAILY
Qty: 90 CAPSULE | Refills: 3 | Status: SHIPPED | OUTPATIENT
Start: 2024-05-24

## 2024-08-16 DIAGNOSIS — K29.50 CHRONIC GASTRITIS WITHOUT BLEEDING, UNSPECIFIED GASTRITIS TYPE: ICD-10-CM

## 2024-08-16 RX ORDER — OMEPRAZOLE 40 MG/1
40 CAPSULE, DELAYED RELEASE ORAL DAILY
Qty: 90 CAPSULE | Refills: 0 | Status: SHIPPED | OUTPATIENT
Start: 2024-08-16

## 2024-11-13 DIAGNOSIS — K29.50 CHRONIC GASTRITIS WITHOUT BLEEDING, UNSPECIFIED GASTRITIS TYPE: ICD-10-CM

## 2024-11-13 RX ORDER — OMEPRAZOLE 40 MG/1
40 CAPSULE, DELAYED RELEASE ORAL DAILY
Qty: 90 CAPSULE | Refills: 0 | Status: SHIPPED | OUTPATIENT
Start: 2024-11-13

## 2025-02-04 DIAGNOSIS — K29.50 CHRONIC GASTRITIS WITHOUT BLEEDING, UNSPECIFIED GASTRITIS TYPE: ICD-10-CM

## 2025-02-05 DIAGNOSIS — K29.50 CHRONIC GASTRITIS WITHOUT BLEEDING, UNSPECIFIED GASTRITIS TYPE: ICD-10-CM

## 2025-02-05 RX ORDER — OMEPRAZOLE 40 MG/1
40 CAPSULE, DELAYED RELEASE ORAL DAILY
Qty: 90 CAPSULE | Refills: 0 | OUTPATIENT
Start: 2025-02-05

## 2025-02-06 RX ORDER — OMEPRAZOLE 40 MG/1
40 CAPSULE, DELAYED RELEASE ORAL DAILY
Qty: 90 CAPSULE | Refills: 0 | Status: SHIPPED | OUTPATIENT
Start: 2025-02-06

## 2025-02-10 ENCOUNTER — HOSPITAL ENCOUNTER (EMERGENCY)
Facility: HOSPITAL | Age: 55
Discharge: HOME | End: 2025-02-10
Attending: EMERGENCY MEDICINE | Admitting: EMERGENCY MEDICINE

## 2025-02-10 VITALS
WEIGHT: 161.82 LBS | RESPIRATION RATE: 18 BRPM | HEART RATE: 97 BPM | TEMPERATURE: 98.5 F | SYSTOLIC BLOOD PRESSURE: 141 MMHG | BODY MASS INDEX: 27.78 KG/M2 | OXYGEN SATURATION: 97 % | DIASTOLIC BLOOD PRESSURE: 73 MMHG

## 2025-02-10 DIAGNOSIS — L03.317 CELLULITIS, GLUTEAL, LEFT: Primary | ICD-10-CM

## 2025-02-10 LAB
ALBUMIN SERPL BCG-MCNC: 3.8 G/DL (ref 3.5–5)
ALP SERPL-CCNC: 148 U/L (ref 34–104)
ALT SERPL W P-5'-P-CCNC: 26 U/L (ref 7–52)
ANION GAP SERPL CALCULATED.3IONS-SCNC: 11 MMOL/L (ref 4–13)
AST SERPL W P-5'-P-CCNC: 21 U/L (ref 13–39)
BASOPHILS # BLD AUTO: 0.01 THOUSANDS/ΜL (ref 0–0.1)
BASOPHILS NFR BLD AUTO: 0 % (ref 0–1)
BILIRUB SERPL-MCNC: 0.49 MG/DL (ref 0.2–1)
BUN SERPL-MCNC: 20 MG/DL (ref 5–25)
CALCIUM SERPL-MCNC: 9.7 MG/DL (ref 8.4–10.2)
CHLORIDE SERPL-SCNC: 102 MMOL/L (ref 96–108)
CO2 SERPL-SCNC: 21 MMOL/L (ref 21–32)
CREAT SERPL-MCNC: 0.36 MG/DL (ref 0.6–1.3)
EOSINOPHIL # BLD AUTO: 0.07 THOUSAND/ΜL (ref 0–0.61)
EOSINOPHIL NFR BLD AUTO: 1 % (ref 0–6)
ERYTHROCYTE [DISTWIDTH] IN BLOOD BY AUTOMATED COUNT: 12.9 % (ref 11.6–15.1)
GFR SERPL CREATININE-BSD FRML MDRD: 122 ML/MIN/1.73SQ M
GLUCOSE SERPL-MCNC: 149 MG/DL (ref 65–140)
HCT VFR BLD AUTO: 40.5 % (ref 34.8–46.1)
HGB BLD-MCNC: 13.4 G/DL (ref 11.5–15.4)
IMM GRANULOCYTES # BLD AUTO: 0.01 THOUSAND/UL (ref 0–0.2)
IMM GRANULOCYTES NFR BLD AUTO: 0 % (ref 0–2)
LACTATE SERPL-SCNC: 0.8 MMOL/L (ref 0.5–2)
LYMPHOCYTES # BLD AUTO: 2.02 THOUSANDS/ΜL (ref 0.6–4.47)
LYMPHOCYTES NFR BLD AUTO: 25 % (ref 14–44)
MCH RBC QN AUTO: 25.9 PG (ref 26.8–34.3)
MCHC RBC AUTO-ENTMCNC: 33.1 G/DL (ref 31.4–37.4)
MCV RBC AUTO: 78 FL (ref 82–98)
MONOCYTES # BLD AUTO: 0.75 THOUSAND/ΜL (ref 0.17–1.22)
MONOCYTES NFR BLD AUTO: 9 % (ref 4–12)
NEUTROPHILS # BLD AUTO: 5.39 THOUSANDS/ΜL (ref 1.85–7.62)
NEUTS SEG NFR BLD AUTO: 65 % (ref 43–75)
PLATELET # BLD AUTO: 129 THOUSANDS/UL (ref 149–390)
PMV BLD AUTO: 11.2 FL (ref 8.9–12.7)
POTASSIUM SERPL-SCNC: 3.8 MMOL/L (ref 3.5–5.3)
PROCALCITONIN SERPL-MCNC: <0.05 NG/ML
PROT SERPL-MCNC: 7.2 G/DL (ref 6.4–8.4)
RBC # BLD AUTO: 5.18 MILLION/UL (ref 3.81–5.12)
SODIUM SERPL-SCNC: 134 MMOL/L (ref 135–147)
WBC # BLD AUTO: 8.25 THOUSAND/UL (ref 4.31–10.16)

## 2025-02-10 PROCEDURE — 96365 THER/PROPH/DIAG IV INF INIT: CPT

## 2025-02-10 PROCEDURE — 96368 THER/DIAG CONCURRENT INF: CPT

## 2025-02-10 PROCEDURE — 99284 EMERGENCY DEPT VISIT MOD MDM: CPT

## 2025-02-10 PROCEDURE — 85025 COMPLETE CBC W/AUTO DIFF WBC: CPT

## 2025-02-10 PROCEDURE — 84145 PROCALCITONIN (PCT): CPT

## 2025-02-10 PROCEDURE — 83605 ASSAY OF LACTIC ACID: CPT

## 2025-02-10 PROCEDURE — 36415 COLL VENOUS BLD VENIPUNCTURE: CPT

## 2025-02-10 PROCEDURE — 80053 COMPREHEN METABOLIC PANEL: CPT

## 2025-02-10 PROCEDURE — 99282 EMERGENCY DEPT VISIT SF MDM: CPT

## 2025-02-10 RX ORDER — CEFAZOLIN SODIUM 2 G/50ML
2000 SOLUTION INTRAVENOUS ONCE
Status: COMPLETED | OUTPATIENT
Start: 2025-02-10 | End: 2025-02-10

## 2025-02-10 RX ORDER — SULFAMETHOXAZOLE AND TRIMETHOPRIM 800; 160 MG/1; MG/1
1 TABLET ORAL 2 TIMES DAILY
Qty: 14 TABLET | Refills: 0 | Status: SHIPPED | OUTPATIENT
Start: 2025-02-10 | End: 2025-02-17

## 2025-02-10 RX ADMIN — SODIUM CHLORIDE, SODIUM LACTATE, POTASSIUM CHLORIDE, AND CALCIUM CHLORIDE 1000 ML: .6; .31; .03; .02 INJECTION, SOLUTION INTRAVENOUS at 16:17

## 2025-02-10 RX ADMIN — CEFAZOLIN SODIUM 2000 MG: 2 SOLUTION INTRAVENOUS at 16:19

## 2025-02-10 NOTE — DISCHARGE INSTRUCTIONS
Complete course of Bactrim.  Follow-up with PCP for wound check in 1 week.  Return for new or worsening symptoms.

## 2025-02-11 NOTE — ED PROVIDER NOTES
"Time reflects when diagnosis was documented in both MDM as applicable and the Disposition within this note       Time User Action Codes Description Comment    2/10/2025  5:07 PM Jerilyn Cornell Add [L03.317] Cellulitis, gluteal, left           ED Disposition       ED Disposition   Discharge    Condition   Stable    Date/Time   Mon Feb 10, 2025  5:09 PM    Comment   Toshia Warren discharge to home/self care.                   Assessment & Plan       Medical Decision Making  54-year-old female presenting to the emergency department for possible abscess of the gluteal region.  She reports that she thinks it is near her vagina but on exam was the base of the left glue.  Denies drainage.  Denies fevers.  Reports that the area is exquisitely tender.    Cellulitis of the [place] is present, induration at the area noted.  There is no fluctuance at this time.  Discussed sitz bath, use of Bactrim.  Patient is tachycardic with a possible source of infection.  Will check basic labs.  WBC, lactic, Pro-Ace normal.  Dose of IV Ancef given in the ED.  Discharged on Bactrim.  Discussed importance of follow-up with PCP in 1 week for wound check.  Discussed findings from the visit with the patient.  We had a conversation regarding supportive care and indications for return.  Recommended appropriate follow-up.  Patient and/or family understand and agree with plan.    Portions of the record may have been created with voice recognition software. Occasional use of the incorrect word or \"sound a like\" substitutions may have occurred due to the inherent limitations of voice recognition software. Read the chart carefully and recognize, using context, where substitutions have occurred.       Amount and/or Complexity of Data Reviewed  Labs: ordered.    Risk  Prescription drug management.             Medications   lactated ringers bolus 1,000 mL (0 mL Intravenous Stopped 2/10/25 7964)   ceFAZolin (ANCEF) IVPB (premix in dextrose) 2,000 mg 50 mL " (0 mg Intravenous Stopped 2/10/25 9829)       ED Risk Strat Scores                          SBIRT 20yo+      Flowsheet Row Most Recent Value   Initial Alcohol Screen: US AUDIT-C     1. How often do you have a drink containing alcohol? 0 Filed at: 02/10/2025 1629   2. How many drinks containing alcohol do you have on a typical day you are drinking?  0 Filed at: 02/10/2025 1629   3b. FEMALE Any Age, or MALE 65+: How often do you have 4 or more drinks on one occassion? 0 Filed at: 02/10/2025 1629   Audit-C Score 0 Filed at: 02/10/2025 1629   BRODIE: How many times in the past year have you...    Used an illegal drug or used a prescription medication for non-medical reasons? Never Filed at: 02/10/2025 1629                            History of Present Illness       Chief Complaint   Patient presents with    Abscess     Pt reports abscess on vagina beginning two days. States is large in size. Denies fevers       Past Medical History:   Diagnosis Date    Anxiety     Asthma     Gastritis       Past Surgical History:   Procedure Laterality Date    ENDOMETRIAL ABLATION W/ RAISSA        Family History   Problem Relation Age of Onset    Cancer Mother     No Known Problems Father       Social History     Tobacco Use    Smoking status: Every Day     Current packs/day: 0.25     Types: Cigarettes     Passive exposure: Current    Smokeless tobacco: Never   Vaping Use    Vaping status: Never Used   Substance Use Topics    Alcohol use: Yes     Comment: socially    Drug use: No      E-Cigarette/Vaping    E-Cigarette Use Never User       E-Cigarette/Vaping Substances      I have reviewed and agree with the history as documented.     Patient presents with:  Abscess: Pt reports abscess on vagina beginning two days. States is large in size. Denies fevers          Abscess  Associated symptoms: no fever        Review of Systems   Constitutional:  Negative for chills and fever.   Musculoskeletal:  Negative for arthralgias, back pain, gait  problem, joint swelling and myalgias.   Skin:  Positive for wound. Negative for color change and rash.   Neurological:  Negative for weakness and numbness.   All other systems reviewed and are negative.          Objective       ED Triage Vitals   Temperature Pulse Blood Pressure Respirations SpO2 Patient Position - Orthostatic VS   02/10/25 1458 02/10/25 1458 02/10/25 1458 02/10/25 1458 02/10/25 1458 02/10/25 1741   98.5 °F (36.9 °C) (!) 121 157/81 16 95 % Sitting      Temp src Heart Rate Source BP Location FiO2 (%) Pain Score    -- 02/10/25 1741 02/10/25 1741 -- 02/10/25 1741     Monitor Right arm  4      Vitals      Date and Time Temp Pulse SpO2 Resp BP Pain Score FACES Pain Rating User   02/10/25 1741 -- 97 97 % 18 141/73 4 -- JS   02/10/25 1619 -- 101 -- -- -- -- --    02/10/25 1458 98.5 °F (36.9 °C) 121 95 % 16 157/81 -- -- JL            Physical Exam  Vitals and nursing note reviewed.   Constitutional:       General: She is not in acute distress.     Appearance: She is well-developed.   HENT:      Head: Normocephalic and atraumatic.   Eyes:      Conjunctiva/sclera: Conjunctivae normal.   Cardiovascular:      Rate and Rhythm: Normal rate and regular rhythm.      Heart sounds: No murmur heard.  Pulmonary:      Effort: Pulmonary effort is normal.      Breath sounds: Normal breath sounds.   Abdominal:      Palpations: Abdomen is soft.   Genitourinary:         Comments: Cellulitis extending approx 8 x6 cm across the L glute. 3 x 2 cm induration a the center. No fluctuance. Scab indicating previous drainage. No active drainage. Does not involve the rectum or vulva  Musculoskeletal:      Cervical back: Neck supple.   Skin:     General: Skin is warm and dry.      Capillary Refill: Capillary refill takes less than 2 seconds.   Neurological:      Mental Status: She is alert.   Psychiatric:         Mood and Affect: Mood normal.         Results Reviewed       Procedure Component Value Units Date/Time    Procalcitonin  [[id number]]  (Normal) Collected: 02/10/25 1617    Lab Status: Final result Specimen: Blood from Arm, Left Updated: 02/10/25 1703     Procalcitonin <0.05 ng/ml     Comprehensive metabolic panel [[id number]]  (Abnormal) Collected: 02/10/25 1617    Lab Status: Final result Specimen: Blood from Arm, Left Updated: 02/10/25 1659     Sodium 134 mmol/L      Potassium 3.8 mmol/L      Chloride 102 mmol/L      CO2 21 mmol/L      ANION GAP 11 mmol/L      BUN 20 mg/dL      Creatinine 0.36 mg/dL      Glucose 149 mg/dL      Calcium 9.7 mg/dL      AST 21 U/L      ALT 26 U/L      Alkaline Phosphatase 148 U/L      Total Protein 7.2 g/dL      Albumin 3.8 g/dL      Total Bilirubin 0.49 mg/dL      eGFR 122 ml/min/1.73sq m     Narrative:      National Kidney Disease Foundation guidelines for Chronic Kidney Disease (CKD):     Stage 1 with normal or high GFR (GFR > 90 mL/min/1.73 square meters)    Stage 2 Mild CKD (GFR = 60-89 mL/min/1.73 square meters)    Stage 3A Moderate CKD (GFR = 45-59 mL/min/1.73 square meters)    Stage 3B Moderate CKD (GFR = 30-44 mL/min/1.73 square meters)    Stage 4 Severe CKD (GFR = 15-29 mL/min/1.73 square meters)    Stage 5 End Stage CKD (GFR <15 mL/min/1.73 square meters)  Note: GFR calculation is accurate only with a steady state creatinine    CBC and differential [[id number]]  (Abnormal) Collected: 02/10/25 1617    Lab Status: Final result Specimen: Blood from Arm, Left Updated: 02/10/25 1647     WBC 8.25 Thousand/uL      RBC 5.18 Million/uL      Hemoglobin 13.4 g/dL      Hematocrit 40.5 %      MCV 78 fL      MCH 25.9 pg      MCHC 33.1 g/dL      RDW 12.9 %      MPV 11.2 fL      Platelets 129 Thousands/uL      Segmented % 65 %      Immature Grans % 0 %      Lymphocytes % 25 %      Monocytes % 9 %      Eosinophils Relative 1 %      Basophils Relative 0 %      Absolute Neutrophils 5.39 Thousands/µL      Absolute Immature Grans 0.01 Thousand/uL      Absolute Lymphocytes 2.02 Thousands/µL      Absolute Monocytes  0.75 Thousand/µL      Eosinophils Absolute 0.07 Thousand/µL      Basophils Absolute 0.01 Thousands/µL     Lactic acid, plasma (w/reflex if result > 2.0) [[id number]]  (Normal) Collected: 02/10/25 1617    Lab Status: Final result Specimen: Blood from Arm, Left Updated: 02/10/25 1641     LACTIC ACID 0.8 mmol/L     Narrative:      Result may be elevated if tourniquet was used during collection.            No orders to display       Procedures    ED Medication and Procedure Management   Prior to Admission Medications   Prescriptions Last Dose Informant Patient Reported? Taking?   albuterol (PROVENTIL HFA,VENTOLIN HFA) 90 mcg/act inhaler   No No   Sig: Inhale 2 puffs every 6 (six) hours as needed for wheezing   benzonatate (TESSALON PERLES) 100 mg capsule   No No   Sig: Take 1 capsule (100 mg total) by mouth 3 (three) times a day as needed for cough   fluticasone (FLONASE) 50 mcg/act nasal spray   No No   Si sprays into each nostril 2 (two) times a day   ipratropium-albuterol (DUO-NEB) 0.5-2.5 mg/3 mL nebulizer solution   No No   Sig: USE 3 ML VIA NEBULIZER FOUR TIMES DAILY   montelukast (SINGULAIR) 10 mg tablet   No No   Sig: Take 1 tablet (10 mg total) by mouth daily at bedtime   omeprazole (PriLOSEC) 40 MG capsule   No No   Sig: Take 1 capsule (40 mg total) by mouth daily   omeprazole (PriLOSEC) 40 MG capsule   No No   Sig: Take 1 capsule (40 mg total) by mouth daily   predniSONE 20 mg tablet   No No   Sig: Take 2 tablets (40 mg total) by mouth daily      Facility-Administered Medications: None     Discharge Medication List as of 2/10/2025  5:10 PM        START taking these medications    Details   sulfamethoxazole-trimethoprim (BACTRIM DS) 800-160 mg per tablet Take 1 tablet by mouth 2 (two) times a day for 7 days smx-tmp DS (BACTRIM) 800-160 mg tabs (1tab q12 D10), Starting Mon 2/10/2025, Until 2025, Normal           CONTINUE these medications which have NOT CHANGED    Details   albuterol (PROVENTIL  HFA,VENTOLIN HFA) 90 mcg/act inhaler Inhale 2 puffs every 6 (six) hours as needed for wheezing, Starting Wed 12/13/2023, Normal      benzonatate (TESSALON PERLES) 100 mg capsule Take 1 capsule (100 mg total) by mouth 3 (three) times a day as needed for cough, Starting Mon 10/30/2023, Normal      fluticasone (FLONASE) 50 mcg/act nasal spray 2 sprays into each nostril 2 (two) times a day, Starting Wed 12/13/2023, Normal      ipratropium-albuterol (DUO-NEB) 0.5-2.5 mg/3 mL nebulizer solution USE 3 ML VIA NEBULIZER FOUR TIMES DAILY, Normal      montelukast (SINGULAIR) 10 mg tablet Take 1 tablet (10 mg total) by mouth daily at bedtime, Starting Wed 12/13/2023, Normal      !! omeprazole (PriLOSEC) 40 MG capsule Take 1 capsule (40 mg total) by mouth daily, Starting Wed 11/13/2024, Normal      !! omeprazole (PriLOSEC) 40 MG capsule Take 1 capsule (40 mg total) by mouth daily, Starting Thu 2/6/2025, Normal      predniSONE 20 mg tablet Take 2 tablets (40 mg total) by mouth daily, Starting Fri 12/8/2023, Normal       !! - Potential duplicate medications found. Please discuss with provider.        No discharge procedures on file.  ED SEPSIS DOCUMENTATION   Time reflects when diagnosis was documented in both MDM as applicable and the Disposition within this note       Time User Action Codes Description Comment    2/10/2025  5:07 PM Jerilyn Cornell Add [L03.317] Cellulitis, gluteal, left                  Jerilyn Cornell PA-C  02/10/25 2039

## 2025-02-14 ENCOUNTER — OFFICE VISIT (OUTPATIENT)
Dept: FAMILY MEDICINE CLINIC | Facility: CLINIC | Age: 55
End: 2025-02-14

## 2025-02-14 VITALS
WEIGHT: 165.6 LBS | DIASTOLIC BLOOD PRESSURE: 70 MMHG | RESPIRATION RATE: 17 BRPM | HEART RATE: 102 BPM | SYSTOLIC BLOOD PRESSURE: 142 MMHG | BODY MASS INDEX: 28.27 KG/M2 | HEIGHT: 64 IN | OXYGEN SATURATION: 98 % | TEMPERATURE: 97.6 F

## 2025-02-14 DIAGNOSIS — L03.317 CELLULITIS, GLUTEAL, LEFT: ICD-10-CM

## 2025-02-14 DIAGNOSIS — Z00.00 ANNUAL PHYSICAL EXAM: Primary | ICD-10-CM

## 2025-02-14 PROCEDURE — 99396 PREV VISIT EST AGE 40-64: CPT

## 2025-02-14 PROCEDURE — 99213 OFFICE O/P EST LOW 20 MIN: CPT

## 2025-02-14 NOTE — PATIENT INSTRUCTIONS
"Patient Education     Routine physical for adults   The Basics   Written by the doctors and editors at [facility]   What is a physical? -- A physical is a routine visit, or \"check-up,\" with your doctor. You might also hear it called a \"wellness visit\" or \"preventive visit.\"  During each visit, the doctor will:   Ask about your physical and mental health   Ask about your habits, behaviors, and lifestyle   Do an exam   Give you vaccines if needed   Talk to you about any medicines you take   Give advice about your health   Answer your questions  Getting regular check-ups is an important part of taking care of your health. It can help your doctor find and treat any problems you have. But it's also important for preventing health problems.  A routine physical is different from a \"sick visit.\" A sick visit is when you see a doctor because of a health concern or problem. Since physicals are scheduled ahead of time, you can think about what you want to ask the doctor.  How often should I get a physical? -- It depends on your age and health. In general, for people age 21 years and older:   If you are younger than 50 years, you might be able to get a physical every 3 years.   If you are 50 years or older, your doctor might recommend a physical every year.  If you have an ongoing health condition, like diabetes or high blood pressure, your doctor will probably want to see you more often.  What happens during a physical? -- In general, each visit will include:   Physical exam - The doctor or nurse will check your height, weight, heart rate, and blood pressure. They will also look at your eyes and ears. They will ask about how you are feeling and whether you have any symptoms that bother you.   Medicines - It's a good idea to bring a list of all the medicines you take to each doctor visit. Your doctor will talk to you about your medicines and answer any questions. Tell them if you are having any side effects that bother you. You " "should also tell them if you are having trouble paying for any of your medicines.   Habits and behaviors - This includes:   Your diet   Your exercise habits   Whether you smoke, drink alcohol, or use drugs   Whether you are sexually active   Whether you feel safe at home  Your doctor will talk to you about things you can do to improve your health and lower your risk of health problems. They will also offer help and support. For example, if you want to quit smoking, they can give you advice and might prescribe medicines. If you want to improve your diet or get more physical activity, they can help you with this, too.   Lab tests, if needed - The tests you get will depend on your age and situation. For example, your doctor might want to check your:   Cholesterol   Blood sugar   Iron level   Vaccines - The recommended vaccines will depend on your age, health, and what vaccines you already had. Vaccines are very important because they can prevent certain serious or deadly infections.   Discussion of screening - \"Screening\" means checking for diseases or other health problems before they cause symptoms. Your doctor can recommend screening based on your age, risk, and preferences. This might include tests to check for:   Cancer, such as breast, prostate, cervical, ovarian, colorectal, prostate, lung, or skin cancer   Sexually transmitted infections, such as chlamydia and gonorrhea   Mental health conditions like depression and anxiety  Your doctor will talk to you about the different types of screening tests. They can help you decide which screenings to have. They can also explain what the results might mean.   Answering questions - The physical is a good time to ask the doctor or nurse questions about your health. If needed, they can refer you to other doctors or specialists, too.  Adults older than 65 years often need other care, too. As you get older, your doctor will talk to you about:   How to prevent falling at " home   Hearing or vision tests   Memory testing   How to take your medicines safely   Making sure that you have the help and support you need at home  All topics are updated as new evidence becomes available and our peer review process is complete.  This topic retrieved from [facility] [employer] on: May 02, 2024.  Topic 810553 Version 1.0  Release: 32.4.3 - C32.122  © 2024 UpToDate, Inc. and/or its affiliates. All rights reserved.  Consumer Information Use and Disclaimer   Disclaimer: This generalized information is a limited summary of diagnosis, treatment, and/or medication information. It is not meant to be comprehensive and should be used as a tool to help the user understand and/or assess potential diagnostic and treatment options. It does NOT include all information about conditions, treatments, medications, side effects, or risks that may apply to a specific patient. It is not intended to be medical advice or a substitute for the medical advice, diagnosis, or treatment of a health care provider based on the health care provider's examination and assessment of a patient's specific and unique circumstances. Patients must speak with a health care provider for complete information about their health, medical questions, and treatment options, including any risks or benefits regarding use of medications. This information does not endorse any treatments or medications as safe, effective, or approved for treating a specific patient. UpToDate, Inc. and its affiliates disclaim any warranty or liability relating to this information or the use thereof.The use of this information is governed by the Terms of Use, available at https://www.woltersLVL6uwer.com/en/know/clinical-effectiveness-terms. 2024© UpToDate, Inc. and its affiliates and/or licensors. All rights reserved.  Copyright   © 2024 UpToDate, Inc. and/or its affiliates. All rights reserved.

## 2025-02-14 NOTE — PROGRESS NOTES
"Name: Toshia Warren      : 1970      MRN: [id number]  Encounter Provider: TERESA Sylvester  Encounter Date: 2025   Encounter department: [facility]  :  Assessment & Plan           History of Present Illness   54-year-old female presenting to the emergency department for possible abscess of the gluteal region.  She reports that she thinks it is near her vagina but on exam was the base of the left glue.  Denies drainage.  Denies fevers.  Reports that the area is exquisitely tender.     Cellulitis of the [place] is present, induration at the area noted.  There is no fluctuance at this time.  Discussed sitz bath, use of Bactrim.  Patient is tachycardic with a possible source of infection.  Will check basic labs.  WBC, lactic, Pro-Ace normal.  Dose of IV Ancef given in the ED.  Discharged on Bactrim.  Discussed importance of follow-up with PCP in 1 week for wound check.  Discussed findings from the visit with the patient.  We had a conversation regarding supportive care and indications for return.  Recommended appropriate follow-up.  Patient and/or family understand and agree with plan.           Review of Systems    Objective   /70 (BP Location: Right arm, Patient Position: Sitting, Cuff Size: Standard)   Pulse 102   Temp 97.6 °F (36.4 °C) (Temporal)   Resp 17   Ht 5' 4\" (1.626 m)   Wt 75.1 kg (165 lb 9.6 oz)   LMP  (LMP Unknown)   SpO2 98%   BMI 28.43 kg/m²      Physical Exam    "

## 2025-02-14 NOTE — PROGRESS NOTES
Adult Annual Physical  Name: Toshia Warren      : 1970      MRN: [id number]  Encounter Provider: TERESA Sylvester  Encounter Date: 2025   Encounter department: [facility]    Assessment & Plan  Annual physical exam         Cellulitis, gluteal, left  -Reviewed ED notes  -Patient reports size and pain is improved  -Continue bactrim as prescribed  -Continue sitz bath and warm compresses  -Keep the wound clean and dry at all times  -ED precautions provided  - to reports to the office sooner if symptoms persists/worsens.          Immunizations and preventive care screenings were discussed with patient today. Appropriate education was printed on patient's after visit summary.    Counseling:  Alcohol/drug use: discussed moderation in alcohol intake, the recommendations for healthy alcohol use, and avoidance of illicit drug use.  Dental Health: discussed importance of regular tooth brushing, flossing, and dental visits.  Injury prevention: discussed safety/seat belts, safety helmets, smoke detectors, carbon monoxide detectors, and smoking near bedding or upholstery.  Sexual health: discussed sexually transmitted diseases, partner selection, use of condoms, avoidance of unintended pregnancy, and contraceptive alternatives.  Exercise: the importance of regular exercise/physical activity was discussed. Recommend exercise 3-5 times per week for at least 30 minutes.          History of Present Illness     Adult Annual Physical:  Patient presents for annual physical. Toshia Warren is a 54 y.o. with  has a past medical history of Anxiety, Asthma, and Gastritis.     Patient presents for ED follow up. Patient was seen in ED on 2/10/25 for cellulitis of the left gluteal region. Patient was prescribed bactrim in which she reports being compliant. Patient reports noting more drainage coming out. States the size went down. She denies sever pain, fever and fatigue. .     Diet  "and Physical Activity:  - Diet/Nutrition: well balanced diet.  - Exercise: no formal exercise.    Depression Screening:  - PHQ-2 Score: 0    General Health:  - Sleep: sleeps well.  - Hearing: decreased hearing right ear.  - Vision: vision problems and wears glasses.  - Dental: brushes teeth twice daily.    /GYN Health:  - Follows with GYN: no.   - History of STDs: no    Review of Systems   Constitutional: Negative.  Negative for chills, fatigue and fever.   HENT: Negative.  Negative for ear pain and sore throat.    Eyes: Negative.  Negative for pain and visual disturbance.   Respiratory: Negative.  Negative for cough and shortness of breath.    Cardiovascular: Negative.  Negative for chest pain and palpitations.   Gastrointestinal: Negative.  Negative for abdominal pain and vomiting.   Endocrine: Negative.    Genitourinary: Negative.  Negative for dysuria and hematuria.   Musculoskeletal: Negative.  Negative for arthralgias and back pain.   Skin:  Positive for wound. Negative for color change and rash.   Allergic/Immunologic: Negative.    Neurological: Negative.  Negative for seizures and syncope.   Hematological: Negative.    Psychiatric/Behavioral: Negative.     All other systems reviewed and are negative.        Objective   /70 (BP Location: Right arm, Patient Position: Sitting, Cuff Size: Standard)   Pulse 102   Temp 97.6 °F (36.4 °C) (Temporal)   Resp 17   Ht 5' 4\" (1.626 m)   Wt 75.1 kg (165 lb 9.6 oz)   LMP  (LMP Unknown)   SpO2 98%   BMI 28.43 kg/m²     Physical Exam  Vitals and nursing note reviewed. Exam conducted with a chaperone present.   Constitutional:       General: She is not in acute distress.     Appearance: Normal appearance. She is well-developed and normal weight.   HENT:      Head: Normocephalic and atraumatic.      Right Ear: Tympanic membrane normal.      Left Ear: Tympanic membrane normal.      Nose: Nose normal.      Mouth/Throat:      Mouth: Mucous membranes are moist. "   Eyes:      Conjunctiva/sclera: Conjunctivae normal.   Cardiovascular:      Rate and Rhythm: Normal rate and regular rhythm.      Pulses: Normal pulses.      Heart sounds: Normal heart sounds. No murmur heard.  Pulmonary:      Effort: Pulmonary effort is normal. No respiratory distress.      Breath sounds: Normal breath sounds.   Abdominal:      General: Abdomen is flat. Bowel sounds are normal.      Palpations: Abdomen is soft.      Tenderness: There is no abdominal tenderness.   Genitourinary:      Musculoskeletal:         General: No swelling. Normal range of motion.      Cervical back: Normal range of motion and neck supple.   Skin:     General: Skin is warm and dry.      Capillary Refill: Capillary refill takes less than 2 seconds.   Neurological:      General: No focal deficit present.      Mental Status: She is alert and oriented to person, place, and time. Mental status is at baseline.   Psychiatric:         Mood and Affect: Mood normal.         Behavior: Behavior normal.         Thought Content: Thought content normal.         Judgment: Judgment normal.

## 2025-02-19 DIAGNOSIS — J45.41 MODERATE PERSISTENT ASTHMA WITH EXACERBATION: ICD-10-CM

## 2025-02-19 RX ORDER — ALBUTEROL SULFATE 90 UG/1
INHALANT RESPIRATORY (INHALATION)
Qty: 54 G | Refills: 0 | Status: SHIPPED | OUTPATIENT
Start: 2025-02-19

## 2025-02-26 ENCOUNTER — TELEMEDICINE (OUTPATIENT)
Dept: FAMILY MEDICINE CLINIC | Facility: CLINIC | Age: 55
End: 2025-02-26

## 2025-02-26 DIAGNOSIS — L03.317 CELLULITIS, GLUTEAL, LEFT: Primary | ICD-10-CM

## 2025-02-26 PROCEDURE — 98008 SYNCH AUDIO-ONLY NEW SF 15: CPT

## 2025-02-26 NOTE — PROGRESS NOTES
Virtual Brief Visit  Name: Toshia Warren      : 1970      MRN: [id number]  Encounter Provider: TERESA Sylvester  Encounter Date: 2025   Encounter department: [facility] [place]  :  Assessment & Plan  Cellulitis, gluteal, left  -Much improved  -Finished bactrim   -Continue sitz bath and warm compresses  -Keep the wound clean and dry at all times  -ED precautions provided  - to reports to the office sooner if symptoms persists/worsens.               History of Present Illness   Toshia Warren is a 54 y.o. with  has a past medical history of Anxiety, Asthma, and Gastritis.     Patient reports for abscess of her left gluteal cellulitis follow up. Patient reports symptoms is much improved. Reports drainage is now minimal, lump is smaller and pain is much improved.         Administrative Statements   Encounter provider TERESA Sylvester    The Patient is located at Home and in the following state in which I hold an active license [place].    The patient was identified by name and date of birth. Toshia Warren was informed that this is a telemedicine visit and that the visit is being conducted through the [employer] Teams platform. She agrees to proceed..  My office door was closed. No one else was in the room.  She acknowledged consent and understanding of privacy and security of the video platform. The patient has agreed to participate and understands they can discontinue the visit at any time.    I have spent a total time of 10 minutes in caring for this patient on the day of the visit/encounter including Instructions for management.

## 2025-05-07 DIAGNOSIS — K29.50 CHRONIC GASTRITIS WITHOUT BLEEDING, UNSPECIFIED GASTRITIS TYPE: ICD-10-CM

## 2025-05-07 RX ORDER — OMEPRAZOLE 40 MG/1
40 CAPSULE, DELAYED RELEASE ORAL DAILY
Qty: 90 CAPSULE | Refills: 0 | Status: SHIPPED | OUTPATIENT
Start: 2025-05-07

## 2025-05-25 ENCOUNTER — HOSPITAL ENCOUNTER (EMERGENCY)
Facility: HOSPITAL | Age: 55
Discharge: HOME | End: 2025-05-25
Attending: EMERGENCY MEDICINE | Admitting: EMERGENCY MEDICINE

## 2025-05-25 VITALS
HEART RATE: 95 BPM | TEMPERATURE: 98.2 F | SYSTOLIC BLOOD PRESSURE: 153 MMHG | RESPIRATION RATE: 18 BRPM | OXYGEN SATURATION: 97 % | DIASTOLIC BLOOD PRESSURE: 79 MMHG

## 2025-05-25 DIAGNOSIS — L02.91 ABSCESS: Primary | ICD-10-CM

## 2025-05-25 PROCEDURE — 99284 EMERGENCY DEPT VISIT MOD MDM: CPT | Performed by: PHYSICIAN ASSISTANT

## 2025-05-25 PROCEDURE — 99282 EMERGENCY DEPT VISIT SF MDM: CPT

## 2025-05-25 RX ORDER — SULFAMETHOXAZOLE AND TRIMETHOPRIM 800; 160 MG/1; MG/1
1 TABLET ORAL 2 TIMES DAILY
Qty: 14 TABLET | Refills: 0 | Status: SHIPPED | OUTPATIENT
Start: 2025-05-25 | End: 2025-06-01

## 2025-05-25 NOTE — ED PROVIDER NOTES
Time reflects when diagnosis was documented in both MDM as applicable and the Disposition within this note       Time User Action Codes Description Comment    5/25/2025  9:14 AM Matty Walker Add [L02.91] Abscess           ED Disposition       ED Disposition   Discharge    Condition   Stable    Date/Time   Sun May 25, 2025  9:14 AM    Comment   Toshia Eliaschris discharge to home/self care.                   Assessment & Plan       Medical Decision Making  Patient in no acute distress prior to discharge.  Offered I&D of abscess, but patient refused preferring to try outpatient oral antibiotics first and will return if it is not getting better since last time she had this it was worse and she did not have it drained.  I discussed with the patient the risk of potentially worsening infection without I&D and patient report understanding.  Recommended to follow-up with her PCP in 2 days, but return to the emergency department immediately for worsening symptoms or fever.     Risk  Prescription drug management.             Medications - No data to display    ED Risk Strat Scores                    No data recorded                            History of Present Illness       Chief Complaint   Patient presents with    Cyst     Patient reports cyst on buttocks. Using warm compress and sitz bath without relief. Denies drainage or fever.       Past Medical History[1]   Past Surgical History[2]   Family History[3]   Social History[4]   E-Cigarette/Vaping    E-Cigarette Use Never User       E-Cigarette/Vaping Substances      I have reviewed and agree with the history as documented.     Patient is a 54-year-old female presenting the emergency department for evaluation of abscess to left buttock area that began about 3 days ago.  She has been trying warm compresses and sitz bath with only minimal relief.  Patient denies fever, chest pain, and dyspnea.  No history of diabetes.  Denies history of being immunocompromised.      History  provided by:  Patient   used: No        Review of Systems   Constitutional:  Negative for chills and fever.   HENT:  Negative for ear pain and sore throat.    Eyes:  Negative for pain and visual disturbance.   Respiratory:  Negative for cough and shortness of breath.    Cardiovascular:  Negative for chest pain and palpitations.   Gastrointestinal:  Negative for abdominal pain and vomiting.   Genitourinary:  Negative for dysuria and hematuria.   Musculoskeletal:  Negative for arthralgias, back pain and neck stiffness.   Skin:  Negative for color change and rash.   Neurological:  Negative for seizures and syncope.   All other systems reviewed and are negative.          Objective       ED Triage Vitals   Temperature Pulse Blood Pressure Respirations SpO2 Patient Position - Orthostatic VS   05/25/25 0821 05/25/25 0823 05/25/25 0823 05/25/25 0823 05/25/25 0823 05/25/25 0823   98.2 °F (36.8 °C) (!) 110 153/79 18 97 % Sitting      Temp Source Heart Rate Source BP Location FiO2 (%) Pain Score    05/25/25 0821 05/25/25 0823 05/25/25 0823 -- 05/25/25 0823    Oral Monitor Right arm  3      Vitals      Date and Time Temp Pulse SpO2 Resp BP Pain Score FACES Pain Rating User   05/25/25 0915 -- 95 97 % -- -- -- -- ML   05/25/25 0823 -- 110 97 % 18 153/79 3 -- KG   05/25/25 0821 98.2 °F (36.8 °C) -- -- -- -- -- -- KG            Physical Exam  Vitals and nursing note reviewed.   Constitutional:       General: She is not in acute distress.     Appearance: She is well-developed.   HENT:      Head: Normocephalic and atraumatic.     Eyes:      Conjunctiva/sclera: Conjunctivae normal.       Cardiovascular:      Rate and Rhythm: Normal rate and regular rhythm.   Pulmonary:      Effort: Pulmonary effort is normal. No respiratory distress.      Breath sounds: Normal breath sounds.   Abdominal:      Palpations: Abdomen is soft.      Tenderness: There is no abdominal tenderness.     Musculoskeletal:         General: No  swelling.      Cervical back: Neck supple.     Skin:     General: Skin is warm and dry.      Capillary Refill: Capillary refill takes less than 2 seconds.      Findings: Abscess present.          Neurological:      Mental Status: She is alert.     Psychiatric:         Mood and Affect: Mood normal.         Results Reviewed       None            No orders to display       Procedures    ED Medication and Procedure Management   Prior to Admission Medications   Prescriptions Last Dose Informant Patient Reported? Taking?   albuterol (PROVENTIL HFA,VENTOLIN HFA) 90 mcg/act inhaler   No No   Sig: INHALE 2 PUFSF BY MOUTH EVERY 6 HOURS AS NEEDED FOR WHEEZING   benzonatate (TESSALON PERLES) 100 mg capsule   No No   Sig: Take 1 capsule (100 mg total) by mouth 3 (three) times a day as needed for cough   Patient not taking: Reported on 2025   fluticasone (FLONASE) 50 mcg/act nasal spray   No No   Si sprays into each nostril 2 (two) times a day   Patient not taking: Reported on 2025   ipratropium-albuterol (DUO-NEB) 0.5-2.5 mg/3 mL nebulizer solution   No No   Sig: USE 3 ML VIA NEBULIZER FOUR TIMES DAILY   montelukast (SINGULAIR) 10 mg tablet   No No   Sig: Take 1 tablet (10 mg total) by mouth daily at bedtime   Patient not taking: Reported on 2025   omeprazole (PriLOSEC) 40 MG capsule   No No   Sig: Take 1 capsule (40 mg total) by mouth daily   omeprazole (PriLOSEC) 40 MG capsule   No No   Sig: Take 1 capsule (40 mg total) by mouth daily   predniSONE 20 mg tablet   No No   Sig: Take 2 tablets (40 mg total) by mouth daily   Patient not taking: Reported on 2025      Facility-Administered Medications: None     Patient's Medications   Discharge Prescriptions    SULFAMETHOXAZOLE-TRIMETHOPRIM (BACTRIM DS) 800-160 MG PER TABLET    Take 1 tablet by mouth 2 (two) times a day for 7 days smx-tmp DS (BACTRIM) 800-160 mg tabs (1tab q12 D10)       Start Date: 2025 End Date: 2025       Order Dose: 1 tablet        Quantity: 14 tablet    Refills: 0     No discharge procedures on file.  ED SEPSIS DOCUMENTATION   Time reflects when diagnosis was documented in both MDM as applicable and the Disposition within this note       Time User Action Codes Description Comment    5/25/2025  9:14 AM Matty Walker Add [L02.91] Abscess                      [1]   Past Medical History:  Diagnosis Date    Anxiety     Asthma     Gastritis    [2]   Past Surgical History:  Procedure Laterality Date    ENDOMETRIAL ABLATION W/ NOVASURE     [3]   Family History  Problem Relation Name Age of Onset    Cancer Mother      No Known Problems Father     [4]   Social History  Tobacco Use    Smoking status: Every Day     Current packs/day: 0.25     Types: Cigarettes     Passive exposure: Current    Smokeless tobacco: Never   Vaping Use    Vaping status: Never Used   Substance Use Topics    Alcohol use: Yes     Comment: socially    Drug use: No        Matty Walker PA-C  05/25/25 0922

## 2025-05-25 NOTE — DISCHARGE INSTRUCTIONS
Recommend warm compresses for 20 to 30 minutes a few times a day and sitz bath's to encourage drainage and reduction of inflammation.  Take antibiotic as directed until completion.  Follow-up closely with your primary care provider within a couple days.  Return to the emergency department immediately for worsening symptoms or development of fever.

## 2025-05-28 ENCOUNTER — OFFICE VISIT (OUTPATIENT)
Dept: FAMILY MEDICINE CLINIC | Facility: CLINIC | Age: 55
End: 2025-05-28

## 2025-05-28 VITALS
HEART RATE: 103 BPM | DIASTOLIC BLOOD PRESSURE: 80 MMHG | HEIGHT: 64 IN | TEMPERATURE: 97.1 F | RESPIRATION RATE: 16 BRPM | BODY MASS INDEX: 27.3 KG/M2 | OXYGEN SATURATION: 97 % | WEIGHT: 159.9 LBS | SYSTOLIC BLOOD PRESSURE: 140 MMHG

## 2025-05-28 DIAGNOSIS — L03.317 CELLULITIS AND ABSCESS OF BUTTOCK: Primary | ICD-10-CM

## 2025-05-28 DIAGNOSIS — L02.31 CELLULITIS AND ABSCESS OF BUTTOCK: Primary | ICD-10-CM

## 2025-05-28 PROCEDURE — 99213 OFFICE O/P EST LOW 20 MIN: CPT

## 2025-05-28 NOTE — PROGRESS NOTES
Name: Toshia Warren      : 1970      MRN: [id number]  Encounter Provider: TERESA Sylvester  Encounter Date: 2025   Encounter department: [facility] [place]  :  Assessment & Plan  Cellulitis and abscess of buttock  -Reviewed ED notes  -Patient reports size and pain is improved  -Continue bactrim as prescribed  -Continue sitz bath and warm compresses  -Keep the wound clean and dry at all times  -ED precautions provided  - to reports to the office sooner if symptoms persists/worsens.                  History of Present Illness   Toshia Warren is a 54 y.o. with  has a past medical history of Anxiety, Asthma, and Gastritis.       Patient presents for ED follow up. Patient was seen in ED on 25 for abcess of the left gluteal region. Patient was prescribed bactrim in which she reports being compliant. Patient reports noting  drainage coming out. States the size went down. She denies severe pain, fever and fatigue. .            Review of Systems   Constitutional: Negative.  Negative for chills, fatigue and fever.   HENT: Negative.  Negative for ear pain and sore throat.    Eyes: Negative.  Negative for pain and visual disturbance.   Respiratory: Negative.  Negative for cough and shortness of breath.    Cardiovascular: Negative.  Negative for chest pain and palpitations.   Gastrointestinal: Negative.  Negative for abdominal pain and vomiting.   Genitourinary: Negative.  Negative for dysuria and hematuria.   Musculoskeletal: Negative.  Negative for arthralgias and back pain.   Skin:  Positive for wound. Negative for color change and rash.   Neurological: Negative.  Negative for seizures and syncope.   Hematological: Negative.    Psychiatric/Behavioral: Negative.  Negative for behavioral problems.    All other systems reviewed and are negative.      Objective   /80 (BP Location: Right arm, Patient Position: Sitting, Cuff Size: Standard)   Pulse (!) 113   Temp  "(!) 97.1 °F (36.2 °C) (Temporal)   Resp 16   Ht 5' 4\" (1.626 m)   Wt 72.5 kg (159 lb 14.4 oz)   LMP  (LMP Unknown)   SpO2 97%   BMI 27.45 kg/m²      Physical Exam  Vitals and nursing note reviewed. Exam conducted with a chaperone present.   Constitutional:       General: She is not in acute distress.     Appearance: Normal appearance. She is well-developed.   HENT:      Head: Normocephalic and atraumatic.      Right Ear: Tympanic membrane normal.      Left Ear: Tympanic membrane normal.      Nose: Nose normal.      Mouth/Throat:      Mouth: Mucous membranes are moist.     Eyes:      Conjunctiva/sclera: Conjunctivae normal.       Cardiovascular:      Rate and Rhythm: Normal rate and regular rhythm.      Pulses: Normal pulses.      Heart sounds: Normal heart sounds. No murmur heard.  Pulmonary:      Effort: Pulmonary effort is normal. No respiratory distress.      Breath sounds: Normal breath sounds.   Abdominal:      General: Abdomen is flat. Bowel sounds are normal.      Palpations: Abdomen is soft.      Tenderness: There is no abdominal tenderness.   Genitourinary:      Musculoskeletal:         General: No swelling. Normal range of motion.      Cervical back: Normal range of motion and neck supple.     Skin:     General: Skin is warm and dry.      Capillary Refill: Capillary refill takes less than 2 seconds.     Neurological:      General: No focal deficit present.      Mental Status: She is alert and oriented to person, place, and time. Mental status is at baseline.     Psychiatric:         Mood and Affect: Mood normal.         Behavior: Behavior normal.         Thought Content: Thought content normal.         Judgment: Judgment normal.         "

## 2025-07-31 DIAGNOSIS — K29.50 CHRONIC GASTRITIS WITHOUT BLEEDING, UNSPECIFIED GASTRITIS TYPE: ICD-10-CM

## 2025-07-31 RX ORDER — OMEPRAZOLE 40 MG/1
40 CAPSULE, DELAYED RELEASE ORAL DAILY
Qty: 90 CAPSULE | Refills: 0 | Status: SHIPPED | OUTPATIENT
Start: 2025-07-31